# Patient Record
Sex: FEMALE | Race: WHITE | Employment: UNEMPLOYED | URBAN - METROPOLITAN AREA
[De-identification: names, ages, dates, MRNs, and addresses within clinical notes are randomized per-mention and may not be internally consistent; named-entity substitution may affect disease eponyms.]

---

## 2017-03-08 ENCOUNTER — OFFICE VISIT (OUTPATIENT)
Dept: INTERNAL MEDICINE CLINIC | Age: 32
End: 2017-03-08

## 2017-03-08 VITALS
WEIGHT: 159 LBS | HEART RATE: 72 BPM | DIASTOLIC BLOOD PRESSURE: 60 MMHG | HEIGHT: 63 IN | SYSTOLIC BLOOD PRESSURE: 100 MMHG | BODY MASS INDEX: 28.17 KG/M2

## 2017-03-08 DIAGNOSIS — F19.20 DRUG ABUSE AND DEPENDENCE (HCC): Chronic | ICD-10-CM

## 2017-03-08 DIAGNOSIS — G89.29 CHRONIC BILATERAL LOW BACK PAIN WITHOUT SCIATICA: ICD-10-CM

## 2017-03-08 DIAGNOSIS — F25.9 SCHIZOAFFECTIVE DISORDER, UNSPECIFIED TYPE (HCC): Primary | Chronic | ICD-10-CM

## 2017-03-08 DIAGNOSIS — M54.50 CHRONIC BILATERAL LOW BACK PAIN WITHOUT SCIATICA: ICD-10-CM

## 2017-03-08 DIAGNOSIS — Z72.0 TOBACCO ABUSE: ICD-10-CM

## 2017-03-08 DIAGNOSIS — M79.7 FIBROMYALGIA: Chronic | ICD-10-CM

## 2017-03-08 DIAGNOSIS — F41.1 ANXIETY STATE: ICD-10-CM

## 2017-03-08 PROCEDURE — 99213 OFFICE O/P EST LOW 20 MIN: CPT | Performed by: INTERNAL MEDICINE

## 2017-03-08 RX ORDER — MUPIROCIN CALCIUM 20 MG/G
CREAM TOPICAL
Refills: 0 | COMMUNITY
Start: 2017-03-02 | End: 2018-01-31 | Stop reason: CLARIF

## 2017-03-08 RX ORDER — CEPHALEXIN 500 MG/1
CAPSULE ORAL
Refills: 0 | COMMUNITY
Start: 2017-03-02 | End: 2017-11-03

## 2017-03-20 DIAGNOSIS — G89.29 CHRONIC BACK PAIN: ICD-10-CM

## 2017-03-20 DIAGNOSIS — M54.9 CHRONIC BACK PAIN: ICD-10-CM

## 2017-03-20 RX ORDER — GABAPENTIN 800 MG/1
TABLET ORAL
Qty: 90 TABLET | Refills: 0 | Status: SHIPPED | OUTPATIENT
Start: 2017-03-20 | End: 2017-04-28 | Stop reason: SDUPTHER

## 2017-05-22 DIAGNOSIS — G89.29 CHRONIC BILATERAL LOW BACK PAIN WITHOUT SCIATICA: ICD-10-CM

## 2017-05-22 DIAGNOSIS — M79.7 FIBROMYALGIA: Chronic | ICD-10-CM

## 2017-05-22 DIAGNOSIS — M54.50 CHRONIC BILATERAL LOW BACK PAIN WITHOUT SCIATICA: ICD-10-CM

## 2017-05-22 RX ORDER — CELECOXIB 200 MG/1
CAPSULE ORAL
Qty: 60 CAPSULE | Refills: 0 | Status: SHIPPED | OUTPATIENT
Start: 2017-05-22 | End: 2017-06-20 | Stop reason: SDUPTHER

## 2017-06-26 ENCOUNTER — OFFICE VISIT (OUTPATIENT)
Dept: INTERNAL MEDICINE CLINIC | Age: 32
End: 2017-06-26

## 2017-06-26 VITALS
HEIGHT: 63 IN | HEART RATE: 80 BPM | SYSTOLIC BLOOD PRESSURE: 102 MMHG | BODY MASS INDEX: 25.52 KG/M2 | DIASTOLIC BLOOD PRESSURE: 68 MMHG | WEIGHT: 144 LBS

## 2017-06-26 DIAGNOSIS — F19.20 DRUG ABUSE AND DEPENDENCE (HCC): Primary | Chronic | ICD-10-CM

## 2017-06-26 DIAGNOSIS — F25.9 SCHIZOAFFECTIVE DISORDER, UNSPECIFIED TYPE (HCC): Chronic | ICD-10-CM

## 2017-06-26 PROCEDURE — 99213 OFFICE O/P EST LOW 20 MIN: CPT | Performed by: INTERNAL MEDICINE

## 2017-08-30 RX ORDER — TRIAMCINOLONE ACETONIDE 1 MG/G
CREAM TOPICAL
Qty: 454 G | Refills: 0 | Status: SHIPPED | OUTPATIENT
Start: 2017-08-30 | End: 2018-01-31 | Stop reason: CLARIF

## 2017-11-03 ENCOUNTER — HOSPITAL ENCOUNTER (OUTPATIENT)
Dept: OTHER | Age: 32
Discharge: HOME | End: 2017-11-03
Attending: INTERNAL MEDICINE | Admitting: INTERNAL MEDICINE

## 2017-11-03 ENCOUNTER — OFFICE VISIT (OUTPATIENT)
Dept: INTERNAL MEDICINE CLINIC | Age: 32
End: 2017-11-03

## 2017-11-03 VITALS
HEART RATE: 100 BPM | DIASTOLIC BLOOD PRESSURE: 72 MMHG | WEIGHT: 149 LBS | SYSTOLIC BLOOD PRESSURE: 134 MMHG | BODY MASS INDEX: 26.39 KG/M2

## 2017-11-03 DIAGNOSIS — R10.2 PELVIC PAIN IN FEMALE: ICD-10-CM

## 2017-11-03 DIAGNOSIS — M79.7 FIBROMYALGIA: Primary | Chronic | ICD-10-CM

## 2017-11-03 LAB
AMPHETAMINE SCREEN, URINE: POSITIVE
BARBITURATE SCREEN URINE: ABNORMAL
BENZODIAZEPINE SCREEN, URINE: POSITIVE
CANNABINOID SCREEN URINE: POSITIVE
COCAINE METABOLITE SCREEN URINE: ABNORMAL
GONADOTROPIN, CHORIONIC (HCG) QUANT: <5 MIU/ML
Lab: ABNORMAL
METHADONE SCREEN, URINE: ABNORMAL
OPIATE SCREEN URINE: ABNORMAL
OXYCODONE URINE: ABNORMAL
PH UA: 6
PHENCYCLIDINE SCREEN URINE: ABNORMAL
PROPOXYPHENE SCREEN: ABNORMAL

## 2017-11-03 PROCEDURE — 4004F PT TOBACCO SCREEN RCVD TLK: CPT | Performed by: INTERNAL MEDICINE

## 2017-11-03 PROCEDURE — 99213 OFFICE O/P EST LOW 20 MIN: CPT | Performed by: INTERNAL MEDICINE

## 2017-11-03 PROCEDURE — 96372 THER/PROPH/DIAG INJ SC/IM: CPT | Performed by: INTERNAL MEDICINE

## 2017-11-03 PROCEDURE — G8417 CALC BMI ABV UP PARAM F/U: HCPCS | Performed by: INTERNAL MEDICINE

## 2017-11-03 PROCEDURE — G8427 DOCREV CUR MEDS BY ELIG CLIN: HCPCS | Performed by: INTERNAL MEDICINE

## 2017-11-03 PROCEDURE — G8484 FLU IMMUNIZE NO ADMIN: HCPCS | Performed by: INTERNAL MEDICINE

## 2017-11-03 PROCEDURE — 3045F PR MOST RECENT HEMOGLOBIN A1C LEVEL 7.0-9.0%: CPT | Performed by: INTERNAL MEDICINE

## 2017-11-03 RX ORDER — PREDNISONE 10 MG/1
TABLET ORAL
Qty: 30 TABLET | Refills: 0 | Status: SHIPPED | OUTPATIENT
Start: 2017-11-03 | End: 2018-05-23 | Stop reason: ALTCHOICE

## 2017-11-03 RX ORDER — KETOROLAC TROMETHAMINE 30 MG/ML
60 INJECTION, SOLUTION INTRAMUSCULAR; INTRAVENOUS ONCE
Status: COMPLETED | OUTPATIENT
Start: 2017-11-03 | End: 2017-11-03

## 2017-11-03 RX ADMIN — KETOROLAC TROMETHAMINE 60 MG: 30 INJECTION, SOLUTION INTRAMUSCULAR; INTRAVENOUS at 17:06

## 2017-11-03 NOTE — PROGRESS NOTES
Subjective:      Patient ID: Nikki Alex is a 34 y.o. female. HPI  Patient comes the office today complaining of bilateral hip pain and low back pain. Patient reports that this is been going on for the last 4 months or so. She was last seen here about 4 months ago. Because of the patient's history of IV drug abuse and her recent overdose (which she denied happening), she has never been given any narcotic pain medication. Patient has been unable to participate in her activities of daily living including helping her children get to school and do things that they need. She reports that her  has had to do all of the activities. She reports to me that she is not taking any illegal drugs and reports that she is clean and has been for the last several months. Review of Systems    Objective:   Physical Exam   Constitutional: She is oriented to person, place, and time. She appears well-developed and well-nourished. No distress. HENT:   Head: Normocephalic and atraumatic. Pulmonary/Chest: Effort normal. No respiratory distress. Musculoskeletal: She exhibits tenderness (Paraspinal muscles, sacroiliac joints bilaterally and proximal muscles of the lower extremities. ). She exhibits no deformity. She has decreased range of motion in the hips/pelvis reason. She is able to abduct, adduct, flex and extend. She does have a treatment center amount of expressed pain however. Her no physical findings when she does a stings. There is no crepitus. Neurological: She is alert and oriented to person, place, and time. No cranial nerve deficit. Skin: Skin is warm and dry. She is not diaphoretic. Psychiatric: She has a normal mood and affect. Her behavior is normal. Judgment and thought content normal.   Vitals reviewed. Assessment/Plan:  Tk Enriquez was seen today for hip pain.     Diagnoses and all orders for this visit:    Fibromyalgia  Comments:  Patient is taking duloxetine and

## 2017-11-13 DIAGNOSIS — G89.29 CHRONIC BACK PAIN: ICD-10-CM

## 2017-11-13 DIAGNOSIS — M54.9 CHRONIC BACK PAIN: ICD-10-CM

## 2017-11-13 RX ORDER — GABAPENTIN 800 MG/1
800 TABLET ORAL 3 TIMES DAILY
Qty: 90 TABLET | Refills: 3 | Status: SHIPPED | OUTPATIENT
Start: 2017-11-13 | End: 2018-02-23 | Stop reason: SDUPTHER

## 2017-11-13 NOTE — TELEPHONE ENCOUNTER
From: Marilu Ochoa  To: Phoebe Bruno MD  Sent: 11/11/2017 5:20 AM EST  Subject: Medication Renewal Request    Danibozena Paul.  Esthelalola Coco would like a refill of the following medications:  gabapentin (NEURONTIN) 800 MG tablet Phoebe Bruno MD]    Preferred pharmacy: [facility] [address]    Comment:

## 2018-01-31 ENCOUNTER — OFFICE VISIT (OUTPATIENT)
Dept: ENDOCRINOLOGY | Age: 33
End: 2018-01-31

## 2018-01-31 ENCOUNTER — HOSPITAL ENCOUNTER (OUTPATIENT)
Dept: OTHER | Age: 33
Discharge: HOME | End: 2018-01-31
Attending: INTERNAL MEDICINE | Admitting: INTERNAL MEDICINE

## 2018-01-31 VITALS
HEIGHT: 63 IN | HEART RATE: 80 BPM | BODY MASS INDEX: 25.16 KG/M2 | WEIGHT: 142 LBS | SYSTOLIC BLOOD PRESSURE: 118 MMHG | DIASTOLIC BLOOD PRESSURE: 74 MMHG

## 2018-01-31 DIAGNOSIS — E10.8 TYPE 1 DIABETES MELLITUS WITH COMPLICATION (HCC): ICD-10-CM

## 2018-01-31 DIAGNOSIS — E10.8 TYPE 1 DIABETES MELLITUS WITH COMPLICATION (HCC): Primary | ICD-10-CM

## 2018-01-31 LAB
ANION GAP SERPL CALCULATED.3IONS-SCNC: 15 MMOL/L (ref 3–16)
BUN BLDV-MCNC: 9 MG/DL (ref 7–20)
CALCIUM SERPL-MCNC: 9.7 MG/DL (ref 8.3–10.6)
CHLORIDE BLD-SCNC: 99 MMOL/L (ref 99–110)
CO2: 25 MMOL/L (ref 21–32)
CREAT SERPL-MCNC: 0.6 MG/DL (ref 0.6–1.1)
CREATININE URINE: 69.3 MG/DL (ref 28–259)
GFR AFRICAN AMERICAN: >60
GFR NON-AFRICAN AMERICAN: >60
GLUCOSE BLD-MCNC: 243 MG/DL (ref 70–99)
MICROALBUMIN UR-MCNC: <1.2 MG/DL
MICROALBUMIN/CREAT UR-RTO: NORMAL MG/G (ref 0–30)
POTASSIUM SERPL-SCNC: 3.8 MMOL/L (ref 3.5–5.1)
SODIUM BLD-SCNC: 139 MMOL/L (ref 136–145)

## 2018-01-31 PROCEDURE — 96160 PT-FOCUSED HLTH RISK ASSMT: CPT | Performed by: INTERNAL MEDICINE

## 2018-01-31 PROCEDURE — 99215 OFFICE O/P EST HI 40 MIN: CPT | Performed by: INTERNAL MEDICINE

## 2018-01-31 RX ORDER — LANCING DEVICE
EACH MISCELLANEOUS
COMMUNITY
Start: 2017-08-30 | End: 2020-06-30

## 2018-01-31 RX ORDER — LISINOPRIL 2.5 MG/1
2.5 TABLET ORAL DAILY
Qty: 30 TABLET | Refills: 3 | Status: SHIPPED | OUTPATIENT
Start: 2018-01-31 | End: 2018-09-07 | Stop reason: SDUPTHER

## 2018-01-31 RX ORDER — SYRINGE-NEEDLE,INSULIN,0.5 ML 28GX1/2"
SYRINGE, EMPTY DISPOSABLE MISCELLANEOUS
Qty: 10 EACH | Refills: 0 | Status: SHIPPED | OUTPATIENT
Start: 2018-01-31 | End: 2018-09-20 | Stop reason: SDUPTHER

## 2018-01-31 RX ORDER — HYDROXYZINE PAMOATE 50 MG/1
50 CAPSULE ORAL 3 TIMES DAILY PRN
COMMUNITY
Start: 2017-08-23

## 2018-01-31 RX ORDER — LISINOPRIL 2.5 MG/1
TABLET ORAL
COMMUNITY
Start: 2017-12-04 | End: 2018-01-31 | Stop reason: SDUPTHER

## 2018-01-31 RX ORDER — LANCETS 26 GAUGE
EACH MISCELLANEOUS
COMMUNITY
Start: 2017-08-30 | End: 2021-08-11

## 2018-01-31 RX ORDER — RISPERIDONE 1 MG/1
1 TABLET, FILM COATED ORAL
COMMUNITY
Start: 2015-05-02 | End: 2019-05-21

## 2018-01-31 RX ORDER — RISPERIDONE 2 MG/1
2 TABLET, FILM COATED ORAL
COMMUNITY
Start: 2017-08-23 | End: 2019-05-21

## 2018-01-31 RX ORDER — ALPRAZOLAM 1 MG/1
0.5 TABLET ORAL 2 TIMES DAILY
COMMUNITY
End: 2019-11-05

## 2018-01-31 RX ORDER — METHYLPHENIDATE HYDROCHLORIDE 10 MG/1
TABLET ORAL
Refills: 0 | COMMUNITY
Start: 2018-01-09 | End: 2018-09-20

## 2018-01-31 RX ORDER — METHYLPHENIDATE HYDROCHLORIDE 20 MG/1
20 TABLET ORAL
COMMUNITY
End: 2018-09-20

## 2018-01-31 RX ORDER — BREXPIPRAZOLE 2 MG/1
TABLET ORAL
Refills: 1 | COMMUNITY
Start: 2018-01-29 | End: 2019-05-21

## 2018-01-31 ASSESSMENT — PATIENT HEALTH QUESTIONNAIRE - PHQ9
1. LITTLE INTEREST OR PLEASURE IN DOING THINGS: 0
7. TROUBLE CONCENTRATING ON THINGS, SUCH AS READING THE NEWSPAPER OR WATCHING TELEVISION: 3
3. TROUBLE FALLING OR STAYING ASLEEP: 2
9. THOUGHTS THAT YOU WOULD BE BETTER OFF DEAD, OR OF HURTING YOURSELF: 0
SUM OF ALL RESPONSES TO PHQ QUESTIONS 1-9: 17
6. FEELING BAD ABOUT YOURSELF - OR THAT YOU ARE A FAILURE OR HAVE LET YOURSELF OR YOUR FAMILY DOWN: 1
5. POOR APPETITE OR OVEREATING: 2
SUM OF ALL RESPONSES TO PHQ9 QUESTIONS 1 & 2: 3
2. FEELING DOWN, DEPRESSED OR HOPELESS: 3
4. FEELING TIRED OR HAVING LITTLE ENERGY: 3
8. MOVING OR SPEAKING SO SLOWLY THAT OTHER PEOPLE COULD HAVE NOTICED. OR THE OPPOSITE, BEING SO FIGETY OR RESTLESS THAT YOU HAVE BEEN MOVING AROUND A LOT MORE THAN USUAL: 3
10. IF YOU CHECKED OFF ANY PROBLEMS, HOW DIFFICULT HAVE THESE PROBLEMS MADE IT FOR YOU TO DO YOUR WORK, TAKE CARE OF THINGS AT HOME, OR GET ALONG WITH OTHER PEOPLE: 1

## 2018-01-31 ASSESSMENT — ENCOUNTER SYMPTOMS: VISUAL CHANGE: 0

## 2018-01-31 NOTE — PROGRESS NOTES
wheezing, no dyspnea on exertion, no cough  Cardiovascular: no chest pain, no lower extremity edema, no orthopnea, no intermittent leg claudication, no palpitations  Gastrointestinal: no abdominal pain, no nausea, no vomiting, no diarrhea, no constipation, no dysphagia, no heartburn, no bloating  Genitourinary: no dysuria, no urinary incontinence, no urinary hesitancy, no urinary frequency, no feelings of urinary urgency, no nocturia  Musculoskeletal: no joint swelling, no joint stiffness, no joint pain, no muscle cramps, no muscle pain, no bone pain, no fractures  Integument/Breast: hair loss, but no skin rashes, no skin lesions, no itching, no dry skin, no breast pain, no breast mass, no skin hives, no skin discoloration, no nipple discharge  Neurological: no numbness, no tingling, no weakness, no confusion, no headaches, no dizziness, no fainting, no tremors, no decrease in memory, no balance problems  Psychiatric: no anxiety, no depression, no insomnia, no change in personality, no emotional problems, no stress  Hematologic/Lymphatic: no tendency for easy bleeding, no swollen lymph nodes, no tendency for easy bruising  Immunology: no seasonal allergies, no frequent infections, no frequent illnesses  Endocrine: no temperature intolerance no hirsutism, no hot flashes    OBJECTIVE:  /74 (Site: Left Arm, Position: Sitting)   Pulse 80   Ht 5' 3\" (1.6 m)   Wt 142 lb (64.4 kg)   Breastfeeding?  No   BMI 25.15 kg/m²    Wt Readings from Last 3 Encounters:   01/31/18 142 lb (64.4 kg)   11/03/17 149 lb (67.6 kg)   06/26/17 144 lb (65.3 kg)       Constitutional: no acute distress, well appearing and well nourished  Psychiatric: oriented to person, place and time, judgement and insight and normal, recent and remote memory and intact and mood and affect are normal  Skin: skin and subcutaneous tissue is normal without mass, normal turgor  Head and Face: examination of head and face revealed no abnormalities  Eyes: no

## 2018-02-01 LAB
ESTIMATED AVERAGE GLUCOSE: 203 MG/DL
HBA1C MFR BLD: 8.7 %
TSH, 3RD GENERATION: 0.67 MU/L (ref 0.3–4)

## 2018-02-02 LAB — C-PEPTIDE: <0.1 NG/ML (ref 1.1–4.4)

## 2018-02-03 LAB
VITAMIN D2 AND D3, TOTAL: 16.5 NG/ML (ref 30–80)
VITAMIN D2, 25 HYDROXY: <1 NG/ML
VITAMIN D3,25 HYDROXY: 16.5 NG/ML

## 2018-02-05 ENCOUNTER — TELEPHONE (OUTPATIENT)
Dept: ENDOCRINOLOGY | Age: 33
End: 2018-02-05

## 2018-02-06 NOTE — TELEPHONE ENCOUNTER
I call pt and advice her lab result  Pt states that she called [address] to get upgrade and they have insurance problem.

## 2018-02-23 DIAGNOSIS — G89.29 CHRONIC BACK PAIN: ICD-10-CM

## 2018-02-23 DIAGNOSIS — M54.9 CHRONIC BACK PAIN: ICD-10-CM

## 2018-02-23 RX ORDER — GABAPENTIN 800 MG/1
800 TABLET ORAL 3 TIMES DAILY
Qty: 90 TABLET | Refills: 3 | Status: SHIPPED | OUTPATIENT
Start: 2018-02-23 | End: 2018-06-21 | Stop reason: SDUPTHER

## 2018-04-27 ENCOUNTER — TELEPHONE (OUTPATIENT)
Dept: ENDOCRINOLOGY | Age: 33
End: 2018-04-27

## 2018-05-07 RX ORDER — BLOOD-GLUCOSE METER
KIT MISCELLANEOUS
Qty: 150 STRIP | Refills: 3 | Status: SHIPPED | OUTPATIENT
Start: 2018-05-07 | End: 2018-10-08 | Stop reason: SDUPTHER

## 2018-05-07 RX ORDER — TRIAMCINOLONE ACETONIDE 1 MG/G
CREAM TOPICAL
Qty: 454 G | Refills: 0 | Status: SHIPPED | OUTPATIENT
Start: 2018-05-07 | End: 2019-11-05

## 2018-05-23 ENCOUNTER — OFFICE VISIT (OUTPATIENT)
Dept: INTERNAL MEDICINE CLINIC | Age: 33
End: 2018-05-23

## 2018-05-23 VITALS
SYSTOLIC BLOOD PRESSURE: 120 MMHG | HEART RATE: 84 BPM | DIASTOLIC BLOOD PRESSURE: 70 MMHG | BODY MASS INDEX: 24.63 KG/M2 | HEIGHT: 63 IN | WEIGHT: 139 LBS

## 2018-05-23 DIAGNOSIS — F19.20 DRUG ABUSE AND DEPENDENCE (HCC): Primary | Chronic | ICD-10-CM

## 2018-05-23 DIAGNOSIS — F25.9 SCHIZOAFFECTIVE DISORDER, UNSPECIFIED TYPE (HCC): Chronic | ICD-10-CM

## 2018-05-23 PROCEDURE — 4004F PT TOBACCO SCREEN RCVD TLK: CPT | Performed by: INTERNAL MEDICINE

## 2018-05-23 PROCEDURE — 3045F PR MOST RECENT HEMOGLOBIN A1C LEVEL 7.0-9.0%: CPT | Performed by: INTERNAL MEDICINE

## 2018-05-23 PROCEDURE — 2022F DILAT RTA XM EVC RTNOPTHY: CPT | Performed by: INTERNAL MEDICINE

## 2018-05-23 PROCEDURE — G8420 CALC BMI NORM PARAMETERS: HCPCS | Performed by: INTERNAL MEDICINE

## 2018-05-23 PROCEDURE — G8427 DOCREV CUR MEDS BY ELIG CLIN: HCPCS | Performed by: INTERNAL MEDICINE

## 2018-05-23 PROCEDURE — 99213 OFFICE O/P EST LOW 20 MIN: CPT | Performed by: INTERNAL MEDICINE

## 2018-05-28 DIAGNOSIS — M79.7 FIBROMYALGIA: Chronic | ICD-10-CM

## 2018-05-28 DIAGNOSIS — M54.50 CHRONIC BILATERAL LOW BACK PAIN WITHOUT SCIATICA: ICD-10-CM

## 2018-05-28 DIAGNOSIS — G89.29 CHRONIC BILATERAL LOW BACK PAIN WITHOUT SCIATICA: ICD-10-CM

## 2018-05-28 RX ORDER — CELECOXIB 200 MG/1
200 CAPSULE ORAL 2 TIMES DAILY
Qty: 60 CAPSULE | Refills: 5 | Status: SHIPPED | OUTPATIENT
Start: 2018-05-28 | End: 2019-05-21

## 2018-06-22 DIAGNOSIS — G89.29 CHRONIC BACK PAIN: ICD-10-CM

## 2018-06-22 DIAGNOSIS — M54.9 CHRONIC BACK PAIN: ICD-10-CM

## 2018-06-22 RX ORDER — GABAPENTIN 800 MG/1
800 TABLET ORAL 3 TIMES DAILY
Qty: 90 TABLET | Refills: 3 | Status: SHIPPED | OUTPATIENT
Start: 2018-06-22 | End: 2018-07-19 | Stop reason: SDUPTHER

## 2018-07-19 DIAGNOSIS — G89.29 CHRONIC BACK PAIN: ICD-10-CM

## 2018-07-19 DIAGNOSIS — M54.9 CHRONIC BACK PAIN: ICD-10-CM

## 2018-07-19 RX ORDER — GABAPENTIN 800 MG/1
800 TABLET ORAL 3 TIMES DAILY
Qty: 90 TABLET | Refills: 3 | Status: SHIPPED | OUTPATIENT
Start: 2018-07-19 | End: 2018-09-20

## 2018-08-30 ENCOUNTER — TELEPHONE (OUTPATIENT)
Dept: ENDOCRINOLOGY | Age: 33
End: 2018-08-30

## 2018-08-30 DIAGNOSIS — E10.3292 TYPE 1 DIABETES MELLITUS WITH MILD NONPROLIFERATIVE RETINOPATHY OF LEFT EYE WITHOUT MACULAR EDEMA (HCC): Primary | ICD-10-CM

## 2018-08-30 NOTE — TELEPHONE ENCOUNTER
Fax from [facility] re: Drug therapy recommendation. Patient is taking Lisinopril and Celecoxib, please review/evaluate and monitor BP and kidney function.  Send new or Changed rx

## 2018-09-10 ENCOUNTER — TELEPHONE (OUTPATIENT)
Dept: INTERNAL MEDICINE CLINIC | Age: 33
End: 2018-09-10

## 2018-09-20 ENCOUNTER — OFFICE VISIT (OUTPATIENT)
Dept: ENDOCRINOLOGY | Age: 33
End: 2018-09-20

## 2018-09-20 VITALS
WEIGHT: 138 LBS | DIASTOLIC BLOOD PRESSURE: 66 MMHG | SYSTOLIC BLOOD PRESSURE: 114 MMHG | HEIGHT: 63 IN | OXYGEN SATURATION: 100 % | HEART RATE: 85 BPM | BODY MASS INDEX: 24.45 KG/M2

## 2018-09-20 DIAGNOSIS — F41.1 ANXIETY STATE: ICD-10-CM

## 2018-09-20 DIAGNOSIS — K21.9 GASTROESOPHAGEAL REFLUX DISEASE WITHOUT ESOPHAGITIS: Chronic | ICD-10-CM

## 2018-09-20 DIAGNOSIS — F25.0 SCHIZOAFFECTIVE DISORDER, BIPOLAR TYPE (HCC): Chronic | ICD-10-CM

## 2018-09-20 DIAGNOSIS — M54.40 CHRONIC LOW BACK PAIN WITH SCIATICA, SCIATICA LATERALITY UNSPECIFIED, UNSPECIFIED BACK PAIN LATERALITY: ICD-10-CM

## 2018-09-20 DIAGNOSIS — F19.20 DRUG ABUSE AND DEPENDENCE (HCC): Chronic | ICD-10-CM

## 2018-09-20 DIAGNOSIS — E78.2 MIXED HYPERLIPIDEMIA: ICD-10-CM

## 2018-09-20 DIAGNOSIS — G89.29 CHRONIC LOW BACK PAIN WITH SCIATICA, SCIATICA LATERALITY UNSPECIFIED, UNSPECIFIED BACK PAIN LATERALITY: ICD-10-CM

## 2018-09-20 PROCEDURE — 2022F DILAT RTA XM EVC RTNOPTHY: CPT | Performed by: INTERNAL MEDICINE

## 2018-09-20 PROCEDURE — 83036 HEMOGLOBIN GLYCOSYLATED A1C: CPT | Performed by: INTERNAL MEDICINE

## 2018-09-20 PROCEDURE — 99215 OFFICE O/P EST HI 40 MIN: CPT | Performed by: INTERNAL MEDICINE

## 2018-09-20 PROCEDURE — G8427 DOCREV CUR MEDS BY ELIG CLIN: HCPCS | Performed by: INTERNAL MEDICINE

## 2018-09-20 PROCEDURE — G8420 CALC BMI NORM PARAMETERS: HCPCS | Performed by: INTERNAL MEDICINE

## 2018-09-20 PROCEDURE — 4004F PT TOBACCO SCREEN RCVD TLK: CPT | Performed by: INTERNAL MEDICINE

## 2018-09-20 PROCEDURE — 3046F HEMOGLOBIN A1C LEVEL >9.0%: CPT | Performed by: INTERNAL MEDICINE

## 2018-09-20 RX ORDER — SYRINGE-NEEDLE,INSULIN,0.5 ML 28GX1/2"
SYRINGE, EMPTY DISPOSABLE MISCELLANEOUS
Qty: 10 EACH | Refills: 0 | Status: SHIPPED | OUTPATIENT
Start: 2018-09-20

## 2018-09-20 ASSESSMENT — ENCOUNTER SYMPTOMS: VISUAL CHANGE: 0

## 2018-09-20 NOTE — PROGRESS NOTES
years ago. Her disease course has been stable. Hypoglycemia symptoms include confusion, dizziness, nervousness/anxiousness and sweats. Associated symptoms include polyuria. Pertinent negatives for diabetes include no visual change, no weakness and no weight loss. There are no hypoglycemic complications. Pertinent negatives for hypoglycemia complications include no hospitalization, no nocturnal hypoglycemia, no required assistance and no required glucagon injection. Symptoms are stable. Diabetic complications include peripheral neuropathy. Risk factors for coronary artery disease include dyslipidemia and diabetes mellitus. Current diabetic treatment includes insulin pump. She is compliant with treatment most of the time. Her weight is stable. She is following a diabetic diet. Meal planning includes avoidance of concentrated sweets. She has not had a previous visit with a dietitian. She rarely participates in exercise. There is no change in her home blood glucose trend. Her breakfast blood glucose is taken between 7-8 am. Her breakfast blood glucose range is generally 110-130 mg/dl. Her lunch blood glucose is taken between 12-1 pm. Her lunch blood glucose range is generally 110-130 mg/dl. Her dinner blood glucose is taken between 7-8 pm. Her dinner blood glucose range is generally 140-180 mg/dl. An ACE inhibitor/angiotensin II receptor blocker is being taken. She does not see a podiatrist.Eye exam is current.       Her medications for anxiety were adjusted by the new psychiatrist and she feels it is making her symptoms worse. Her pain medications and also been reduced. She has been smoking weed and getting medical marijuana from out of state    Weight trend: stable  Prior visit with dietician: yes  Current diet: on average, 3 meals per day  Current exercise: rare    Has there been any hospitalization, surgery or major illness since the last visit?  No   Has there been any new school, family or social problems since celecoxib (CELEBREX) 200 MG capsule Take 1 capsule by mouth 2 times daily 60 capsule 5    FREESTYLE LITE strip USE TO CHECK BLOOD SUGAR FOUR TIMES DAILY 150 strip 3    triamcinolone (KENALOG) 0.1 % cream APPLY TWICE DAILY 454 g 0    omeprazole (PRILOSEC) 20 MG delayed release capsule TAKE 1 CAPSULE BY MOUTH DAILY 30 capsule 5    REXULTI 2 MG TABS tablet TK 1 T PO QD  1    Lancet Devices (LANCING DEVICE) MISC Use to test BG 6 times daily, Freestyle Lite      LANCETS ULTRA THIN MISC Use to check sugar 4 times daily      hydrOXYzine (VISTARIL) 50 MG capsule Take 50 mg by mouth      ALPRAZolam (XANAX) 1 MG tablet Take 0.5 mg by mouth 2 times daily. [facility] [place] risperiDONE (RISPERDAL) 1 MG tablet Take 1 mg by mouth      risperiDONE (RISPERDAL) 2 MG tablet Take 2 mg by mouth      glucagon 1 MG injection Inject 1 mg into the skin See Admin Instructions Follow package directions for low blood sugar. 1 kit 3    DULoxetine (CYMBALTA) 60 MG capsule Take 90 mg by mouth daily.  traZODone (DESYREL) 100 MG tablet Take 250-400 mg by mouth nightly        No current facility-administered medications for this visit.       Allergies   Allergen Reactions    Ultram [Tramadol]      Family Status   Relation Status    Mother     Father Alive       Review of Systems  Constitutional: no fatigue, no fever, no recent weight gain, no recent weight loss, no changes in appetite  Eyes: no eye pain, no change in vision, no eye redness, no eye irritation, no double vision  Ears, nose, throat: no nasal congestion, no sore throat, no earache, no decrease in hearing, no hoarseness, no dry mouth, no sinus problems, no difficulty swallowing, no neck lumps, no dental problems, no mouth sores, no ringing in ears  Pulmonary: no shortness of breath, no wheezing, no dyspnea on exertion, no cough  Cardiovascular: no chest pain, no lower extremity edema, no orthopnea, no intermittent leg claudication, no palpitations  Gastrointestinal: no tried Lyrica , cymbalta and currently is on 800 mg of Neurontin TID per PCP   She was on metformin in the past but it was stopped --- Last DKA in dec 2015 with pump tubing issues . --- Thyroid normal on palpation No discreet thyroid nodules identified on exam     Hyperlipidemia   ldl borderline 100 >84  She was advised to watch her diet and institute low fat diet   tfts normal     VIT D level was 26   Advised to increase supplement     Migraine headaches   Getting worse as per pt   She was advised to discuss with her primary care physician    Chronic back pain    she was cautioned about anti-inflammatory medication and chronic kidney   She is taking antiinflammatory and it has improved     DEPRESSION/ANXIETY   Follows with psyciatry   She is not happy with all these medication change   Her xanax was reduced and she was taken off ADD medication            Reviewed and/or ordered clinical lab results Yes  Reviewed and/or ordered radiology tests Yes  Reviewed and/or ordered other diagnostic tests Yes  Made a decision to obtain old records Yes  Reviewed and summarized old records Yes      Sherri Quintanilla was counseled regarding symptoms of current diagnosis, course and complications of disease if inadequately treated, side effects of medications, diagnosis, treatment options, and prognosis, risks, benefits, complications, and alternatives of treatment, labs, imaging and other studies and treatment targets and goals. She understands instructions and counseling. These diagnosis were discussed and reviewed with the patient including the advantages of drug therapy. She was counseled at this visit on the following: diabetes complication prevention and foot care. I spent  40 minutes with patient and more than 50 % of this time was spent discussing and providing counseling and coordinating care of patient's multiple health issues      Return in about 3 months (around 12/20/2018).

## 2018-09-21 LAB — HBA1C MFR BLD: 10.6 %

## 2018-09-25 ENCOUNTER — HOSPITAL ENCOUNTER (OUTPATIENT)
Age: 33
Discharge: HOME | End: 2018-09-25
Payer: COMMERCIAL

## 2018-09-25 DIAGNOSIS — E78.2 MIXED HYPERLIPIDEMIA: ICD-10-CM

## 2018-09-25 DIAGNOSIS — E10.3292 TYPE 1 DIABETES MELLITUS WITH MILD NONPROLIFERATIVE RETINOPATHY OF LEFT EYE WITHOUT MACULAR EDEMA (HCC): ICD-10-CM

## 2018-09-25 LAB
A/G RATIO: 1.2 (ref 1.1–2.2)
ALBUMIN SERPL-MCNC: 4.1 G/DL (ref 3.4–5)
ALP BLD-CCNC: 115 U/L (ref 40–129)
ALT SERPL-CCNC: 25 U/L (ref 10–40)
ANION GAP SERPL CALCULATED.3IONS-SCNC: 12 MMOL/L (ref 3–16)
AST SERPL-CCNC: 26 U/L (ref 15–37)
BILIRUB SERPL-MCNC: 0.3 MG/DL (ref 0–1)
BUN BLDV-MCNC: 10 MG/DL (ref 7–20)
CALCIUM SERPL-MCNC: 9.3 MG/DL (ref 8.3–10.6)
CHLORIDE BLD-SCNC: 103 MMOL/L (ref 99–110)
CHOLESTEROL, TOTAL: 155 MG/DL (ref 0–199)
CO2: 24 MMOL/L (ref 21–32)
CREAT SERPL-MCNC: 0.7 MG/DL (ref 0.6–1.1)
CREATININE URINE: 113.5 MG/DL (ref 28–259)
ESTIMATED AVERAGE GLUCOSE: 248.9 MG/DL
GFR AFRICAN AMERICAN: >60
GFR NON-AFRICAN AMERICAN: >60
GLOBULIN: 3.5 G/DL
GLUCOSE BLD-MCNC: 238 MG/DL (ref 70–99)
HBA1C MFR BLD: 10.3 %
HDLC SERPL-MCNC: 48 MG/DL (ref 40–60)
LDL CHOLESTEROL CALCULATED: 93 MG/DL
MICROALBUMIN UR-MCNC: <1.2 MG/DL
MICROALBUMIN/CREAT UR-RTO: NORMAL MG/G (ref 0–30)
POTASSIUM SERPL-SCNC: 4.6 MMOL/L (ref 3.5–5.1)
SODIUM BLD-SCNC: 139 MMOL/L (ref 136–145)
TOTAL PROTEIN: 7.6 G/DL (ref 6.4–8.2)
TRIGL SERPL-MCNC: 71 MG/DL (ref 0–150)
TSH SERPL DL<=0.05 MIU/L-ACNC: 0.62 UIU/ML (ref 0.27–4.2)
VLDLC SERPL CALC-MCNC: 14 MG/DL

## 2018-09-25 PROCEDURE — 36415 COLL VENOUS BLD VENIPUNCTURE: CPT

## 2018-09-25 PROCEDURE — 80061 LIPID PANEL: CPT

## 2018-09-25 PROCEDURE — 84443 ASSAY THYROID STIM HORMONE: CPT

## 2018-09-25 PROCEDURE — 80053 COMPREHEN METABOLIC PANEL: CPT

## 2018-09-25 PROCEDURE — 83036 HEMOGLOBIN GLYCOSYLATED A1C: CPT

## 2018-09-25 PROCEDURE — 82043 UR ALBUMIN QUANTITATIVE: CPT

## 2018-09-25 PROCEDURE — 82570 ASSAY OF URINE CREATININE: CPT

## 2018-09-25 PROCEDURE — 82306 VITAMIN D 25 HYDROXY: CPT

## 2018-09-28 LAB
VITAMIN D2 AND D3, TOTAL: 31.9 NG/ML (ref 30–80)
VITAMIN D2, 25 HYDROXY: <1 NG/ML
VITAMIN D3,25 HYDROXY: 31.9 NG/ML

## 2018-10-01 ENCOUNTER — TELEPHONE (OUTPATIENT)
Dept: ENDOCRINOLOGY | Age: 33
End: 2018-10-01

## 2018-10-05 DIAGNOSIS — K21.9 GASTROESOPHAGEAL REFLUX DISEASE WITHOUT ESOPHAGITIS: Chronic | ICD-10-CM

## 2018-10-05 RX ORDER — OMEPRAZOLE 20 MG/1
CAPSULE, DELAYED RELEASE ORAL
Qty: 30 CAPSULE | Refills: 3 | Status: SHIPPED | OUTPATIENT
Start: 2018-10-05 | End: 2019-01-27 | Stop reason: SDUPTHER

## 2018-10-08 RX ORDER — BLOOD-GLUCOSE METER
KIT MISCELLANEOUS
Qty: 150 STRIP | Refills: 0 | Status: SHIPPED | OUTPATIENT
Start: 2018-10-08 | End: 2018-11-22 | Stop reason: SDUPTHER

## 2018-10-25 ENCOUNTER — TELEPHONE (OUTPATIENT)
Dept: ENDOCRINOLOGY | Age: 33
End: 2018-10-25

## 2018-10-25 NOTE — TELEPHONE ENCOUNTER
Pt faxed letter from [facility] stating approval for sensors for a continuous glucose monitor

## 2018-11-07 ENCOUNTER — OFFICE VISIT (OUTPATIENT)
Dept: INTERNAL MEDICINE CLINIC | Age: 33
End: 2018-11-07
Payer: COMMERCIAL

## 2018-11-07 VITALS
BODY MASS INDEX: 25.69 KG/M2 | RESPIRATION RATE: 18 BRPM | DIASTOLIC BLOOD PRESSURE: 72 MMHG | HEART RATE: 80 BPM | WEIGHT: 145 LBS | OXYGEN SATURATION: 99 % | SYSTOLIC BLOOD PRESSURE: 124 MMHG

## 2018-11-07 DIAGNOSIS — E10.649 UNCONTROLLED TYPE 1 DIABETES MELLITUS WITH HYPOGLYCEMIA, UNSPECIFIED HYPOGLYCEMIA COMA STATUS (HCC): ICD-10-CM

## 2018-11-07 DIAGNOSIS — Z23 NEED FOR INFLUENZA VACCINATION: Primary | ICD-10-CM

## 2018-11-07 DIAGNOSIS — F25.0 SCHIZOAFFECTIVE DISORDER, BIPOLAR TYPE (HCC): Chronic | ICD-10-CM

## 2018-11-07 DIAGNOSIS — Z23 NEED FOR DIPHTHERIA-TETANUS-PERTUSSIS (TDAP) VACCINE: ICD-10-CM

## 2018-11-07 DIAGNOSIS — Z11.4 ENCOUNTER FOR SCREENING FOR HIV: ICD-10-CM

## 2018-11-07 DIAGNOSIS — F19.20 DRUG ABUSE AND DEPENDENCE (HCC): Chronic | ICD-10-CM

## 2018-11-07 LAB — HBA1C MFR BLD: 10 %

## 2018-11-07 PROCEDURE — 90715 TDAP VACCINE 7 YRS/> IM: CPT | Performed by: INTERNAL MEDICINE

## 2018-11-07 PROCEDURE — G8482 FLU IMMUNIZE ORDER/ADMIN: HCPCS | Performed by: INTERNAL MEDICINE

## 2018-11-07 PROCEDURE — 3046F HEMOGLOBIN A1C LEVEL >9.0%: CPT | Performed by: INTERNAL MEDICINE

## 2018-11-07 PROCEDURE — 99213 OFFICE O/P EST LOW 20 MIN: CPT | Performed by: INTERNAL MEDICINE

## 2018-11-07 PROCEDURE — 4004F PT TOBACCO SCREEN RCVD TLK: CPT | Performed by: INTERNAL MEDICINE

## 2018-11-07 PROCEDURE — 90471 IMMUNIZATION ADMIN: CPT | Performed by: INTERNAL MEDICINE

## 2018-11-07 PROCEDURE — G8419 CALC BMI OUT NRM PARAM NOF/U: HCPCS | Performed by: INTERNAL MEDICINE

## 2018-11-07 PROCEDURE — 90686 IIV4 VACC NO PRSV 0.5 ML IM: CPT | Performed by: INTERNAL MEDICINE

## 2018-11-07 PROCEDURE — 83036 HEMOGLOBIN GLYCOSYLATED A1C: CPT | Performed by: INTERNAL MEDICINE

## 2018-11-07 PROCEDURE — 90472 IMMUNIZATION ADMIN EACH ADD: CPT | Performed by: INTERNAL MEDICINE

## 2018-11-07 PROCEDURE — 2022F DILAT RTA XM EVC RTNOPTHY: CPT | Performed by: INTERNAL MEDICINE

## 2018-11-07 PROCEDURE — G8427 DOCREV CUR MEDS BY ELIG CLIN: HCPCS | Performed by: INTERNAL MEDICINE

## 2018-11-07 ASSESSMENT — PATIENT HEALTH QUESTIONNAIRE - PHQ9
2. FEELING DOWN, DEPRESSED OR HOPELESS: 1
1. LITTLE INTEREST OR PLEASURE IN DOING THINGS: 1
SUM OF ALL RESPONSES TO PHQ9 QUESTIONS 1 & 2: 2
SUM OF ALL RESPONSES TO PHQ QUESTIONS 1-9: 2
SUM OF ALL RESPONSES TO PHQ QUESTIONS 1-9: 2

## 2018-11-21 ENCOUNTER — TELEPHONE (OUTPATIENT)
Dept: ENDOCRINOLOGY | Age: 33
End: 2018-11-21

## 2018-11-21 NOTE — TELEPHONE ENCOUNTER
Called pt to discuss pump settings yesterday as well as today again ---made appropriate changes   She

## 2018-11-24 RX ORDER — BLOOD-GLUCOSE METER
KIT MISCELLANEOUS
Qty: 150 STRIP | Refills: 0 | Status: SHIPPED | OUTPATIENT
Start: 2018-11-24 | End: 2018-12-26 | Stop reason: SDUPTHER

## 2018-12-19 LAB — HIV 1+2 AB+HIV1P24 AG, EIA: NORMAL

## 2018-12-20 ENCOUNTER — TELEPHONE (OUTPATIENT)
Dept: ENDOCRINOLOGY | Age: 33
End: 2018-12-20

## 2018-12-21 ENCOUNTER — TELEPHONE (OUTPATIENT)
Dept: ENDOCRINOLOGY | Age: 33
End: 2018-12-21

## 2018-12-27 RX ORDER — BLOOD-GLUCOSE METER
KIT MISCELLANEOUS
Qty: 150 STRIP | Refills: 5 | Status: SHIPPED | OUTPATIENT
Start: 2018-12-27 | End: 2019-05-21 | Stop reason: SDUPTHER

## 2019-01-21 LAB — DIABETIC RETINOPATHY: NORMAL

## 2019-01-24 ENCOUNTER — TELEPHONE (OUTPATIENT)
Dept: ENDOCRINOLOGY | Age: 34
End: 2019-01-24

## 2019-01-27 DIAGNOSIS — K21.9 GASTROESOPHAGEAL REFLUX DISEASE WITHOUT ESOPHAGITIS: Chronic | ICD-10-CM

## 2019-01-28 RX ORDER — OMEPRAZOLE 20 MG/1
CAPSULE, DELAYED RELEASE ORAL
Qty: 30 CAPSULE | Refills: 2 | Status: SHIPPED | OUTPATIENT
Start: 2019-01-28 | End: 2019-04-26 | Stop reason: SDUPTHER

## 2019-03-08 DIAGNOSIS — M54.9 CHRONIC BACK PAIN: ICD-10-CM

## 2019-03-08 DIAGNOSIS — G89.29 CHRONIC BACK PAIN: ICD-10-CM

## 2019-03-11 RX ORDER — GABAPENTIN 800 MG/1
800 TABLET ORAL 3 TIMES DAILY
Qty: 90 TABLET | Refills: 3 | Status: SHIPPED | OUTPATIENT
Start: 2019-03-11 | End: 2019-07-03 | Stop reason: SDUPTHER

## 2019-05-21 ENCOUNTER — OFFICE VISIT (OUTPATIENT)
Dept: ENDOCRINOLOGY | Age: 34
End: 2019-05-21
Payer: COMMERCIAL

## 2019-05-21 VITALS
HEIGHT: 63 IN | OXYGEN SATURATION: 97 % | SYSTOLIC BLOOD PRESSURE: 98 MMHG | BODY MASS INDEX: 24.8 KG/M2 | HEART RATE: 83 BPM | DIASTOLIC BLOOD PRESSURE: 64 MMHG | WEIGHT: 140 LBS

## 2019-05-21 DIAGNOSIS — K21.9 GASTROESOPHAGEAL REFLUX DISEASE WITHOUT ESOPHAGITIS: Chronic | ICD-10-CM

## 2019-05-21 DIAGNOSIS — E10.649 UNCONTROLLED TYPE 1 DIABETES MELLITUS WITH HYPOGLYCEMIA, UNSPECIFIED HYPOGLYCEMIA COMA STATUS (HCC): Primary | ICD-10-CM

## 2019-05-21 DIAGNOSIS — M79.7 FIBROMYALGIA: Chronic | ICD-10-CM

## 2019-05-21 DIAGNOSIS — F25.0 SCHIZOAFFECTIVE DISORDER, BIPOLAR TYPE (HCC): Chronic | ICD-10-CM

## 2019-05-21 PROCEDURE — G8427 DOCREV CUR MEDS BY ELIG CLIN: HCPCS | Performed by: INTERNAL MEDICINE

## 2019-05-21 PROCEDURE — 2022F DILAT RTA XM EVC RTNOPTHY: CPT | Performed by: INTERNAL MEDICINE

## 2019-05-21 PROCEDURE — 83036 HEMOGLOBIN GLYCOSYLATED A1C: CPT | Performed by: INTERNAL MEDICINE

## 2019-05-21 PROCEDURE — 3046F HEMOGLOBIN A1C LEVEL >9.0%: CPT | Performed by: INTERNAL MEDICINE

## 2019-05-21 PROCEDURE — G8420 CALC BMI NORM PARAMETERS: HCPCS | Performed by: INTERNAL MEDICINE

## 2019-05-21 PROCEDURE — 4004F PT TOBACCO SCREEN RCVD TLK: CPT | Performed by: INTERNAL MEDICINE

## 2019-05-21 PROCEDURE — 99214 OFFICE O/P EST MOD 30 MIN: CPT | Performed by: INTERNAL MEDICINE

## 2019-05-21 RX ORDER — LURASIDONE HYDROCHLORIDE 40 MG/1
TABLET, FILM COATED ORAL
Refills: 2 | COMMUNITY
Start: 2019-05-02 | End: 2020-02-12

## 2019-05-21 ASSESSMENT — ENCOUNTER SYMPTOMS: VISUAL CHANGE: 0

## 2019-05-21 NOTE — PROGRESS NOTES
Yomaira Sosa is a 28 y.o. female here for Type 1 Diabetes . Pt was diagnosed with diabetes when she was 14years of age she had a viral infection on a vacation and was sick for almost 2 wks and was later on found to have diabetes . Pt got diabetic eduaction when she was diagnosed initialy and then didn't get any refresher . Pt was on NPH 35 and 10 of R and was switched to 40 units of NPH with 15 of R . Pt has 3 kids one set of twin , she didn't had any complications during her pregnancy and as per pt she had a good control of diabetes during her pregnancies Pt denies any change in her skin texture , denies any change in her bowel habits , denies any heat or cold intolerance , denies any recent weight change ,denies any palpitation . Pt denies any chest pain or SOB . Pt denies any numbness and tingling in her extremities    Diabetes History   Dx: DM Type I   Glucose Monitoring:   Glucose Monitoring: Yes   Glucose Testing Frequency: fasting, Test Site: fingerstick   Home Monitoring Reviewed: Verbal recall   Breakfast: Before: 230 she is not checking any other time of the day   Hypoglycemic Events:   Frequency: none   Hypoglycemia Symptoms: hunger, shaking, unawareness   Dietary Management:   Diet: no restrictions, carb counting   Exercise: Active Lifestyle   Insulin Management:   Site Rotation: yes   Injection/Injection Sites: upper arm, abdomen   Diabetic Eye Exam:   Date of last exam: jan 2014   Physician: eddy   Discussed   Comments: no retinopathy   Associated Symptoms:   Denies: anxiety, claudication, depression, non healing wounds, numbness, orthostasis, polydipsia, polyuria, sexual dysfunction, tingling, vision problems       Comorbid conditions: Neuropathy and Nephropathy  Current diabetic medications include: insulin via pump     INTERIM:    Diabetes   She presents for her follow-up diabetic visit. She has type 1 diabetes mellitus. MedicAlert identification noted.  The initial diagnosis of diabetes was made 25 last visit? No   Has there been any new school, family or social problems since the last visit? No  Has there been any new family history of members with diabetes, heart disease, stroke, or endocrine related problems since the last visit?   No    Past Medical History:   Diagnosis Date    Anemia     Anxiety     Back pain     Diabetes mellitus type 1 ([facility]     Diagnosed at 14years of age; has insulin pump    Fibromyalgia     Shoulder instability     cortizone shot for torn [facility] joint      Patient Active Problem List   Diagnosis    Anxiety     Chronic back pain    Uncontrolled type 1 diabetes mellitus ([facility]    Tobacco abuse    Fibromyalgia    Schizoaffective disorder ([facility]    Gastroesophageal reflux disease without esophagitis    Drug abuse and dependence ([facility]     Past Surgical History:   Procedure Laterality Date     SECTION      LEEP      SHOULDER SURGERY Left     TUBAL LIGATION       Social History     Socioeconomic History    Marital status: Unknown     Spouse name: Not on file    Number of children: Not on file    Years of education: Not on file    Highest education level: Not on file   Occupational History    Not on file   Social Needs    Financial resource strain: Not on file    Food insecurity:     Worry: Not on file     Inability: Not on file    Transportation needs:     Medical: Not on file     Non-medical: Not on file   Tobacco Use    Smoking status: Current Every Day Smoker     Packs/day: 1.00     Years: 12.00     Pack years: 12.00     Types: Cigarettes    Smokeless tobacco: Never Used   Substance and Sexual Activity    Alcohol use: No    Drug use: Yes     Types: Marijuana    Sexual activity: Yes     Partners: Male   Lifestyle    Physical activity:     Days per week: Not on file     Minutes per session: Not on file    Stress: Not on file   Relationships    Social connections:     Talks on phone: Not on file     Gets together: Not on file     Attends Rastafari service: Not on file     Active member of club or organization: Not on file     Attends meetings of clubs or organizations: Not on file     Relationship status: Not on file    Intimate partner violence:     Fear of current or ex partner: Not on file     Emotionally abused: Not on file     Physically abused: Not on file     Forced sexual activity: Not on file   Other Topics Concern    Not on file   Social History Narrative    Not on file     Family History   Problem Relation Age of Onset    High Blood Pressure Mother     Cancer Mother     Diabetes Mother     Depression Mother      Current Outpatient Medications   Medication Sig Dispense Refill    insulin lispro (HUMALOG) 100 UNIT/ML injection vial USE 50 UNITS VIA PUMP EVERY DAY AS DIRECTED 20 mL 0    blood glucose test strips (FREESTYLE LITE) strip USE TO TEST FOUR TIMES DAILY 150 strip 5    omeprazole (PRILOSEC) 20 MG delayed release capsule TAKE 1 CAPSULE BY MOUTH DAILY 30 capsule 2    gabapentin (NEURONTIN) 800 MG tablet Take 1 tablet by mouth 3 times daily for 180 days. 90 tablet 3    INS SYRINGE/NEEDLE .5CC/27G 27G X 1/2\" 0.5 ML MISC To be used in cse of emergency when insulin pump doesn't work 10 each 0    triamcinolone (KENALOG) 0.1 % cream APPLY TWICE DAILY 454 g 0    Lancet Devices (LANCING DEVICE) MISC Use to test BG 6 times daily, Freestyle Lite      LANCETS ULTRA THIN MISC Use to check sugar 4 times daily      hydrOXYzine (VISTARIL) 50 MG capsule Take 50 mg by mouth      ALPRAZolam (XANAX) 1 MG tablet Take 0.5 mg by mouth 2 times daily. Blease Durie glucagon 1 MG injection Inject 1 mg into the skin See Admin Instructions Follow package directions for low blood sugar. 1 kit 3    traZODone (DESYREL) 100 MG tablet Take 250-400 mg by mouth nightly       LATUDA 40 MG TABS tablet 1 tab daily  2     No current facility-administered medications for this visit.       Allergies   Allergen Reactions    Ultram [Tramadol]      Family Status   Relation Name Status    Mother      Father  Alive       Review of Systems  Constitutional: no fatigue, no fever, no recent weight gain, no recent weight loss, no changes in appetite  Eyes: no eye pain, no change in vision, no eye redness, no eye irritation, no double vision  Ears, nose, throat: no nasal congestion, no sore throat, no earache, no decrease in hearing, no hoarseness, no dry mouth, no sinus problems, no difficulty swallowing, no neck lumps, no dental problems, no mouth sores, no ringing in ears  Pulmonary: no shortness of breath, no wheezing, no dyspnea on exertion, no cough  Cardiovascular: no chest pain, no lower extremity edema, no orthopnea, no intermittent leg claudication, no palpitations  Gastrointestinal: no abdominal pain, no nausea, no vomiting, no diarrhea, no constipation, no dysphagia, no heartburn, no bloating  Genitourinary: no dysuria, no urinary incontinence, no urinary hesitancy, no urinary frequency, no feelings of urinary urgency, no nocturia  Musculoskeletal: no joint swelling, no joint stiffness, no joint pain, no muscle cramps, no muscle pain, no bone pain, no fractures  Integument/Breast: hair loss, but no skin rashes, no skin lesions, no itching, no dry skin, no breast pain, no breast mass, no skin hives, no skin discoloration, no nipple discharge  Neurological: no numbness, no tingling, no weakness, no confusion, no headaches, no dizziness, no fainting, no tremors, no decrease in memory, no balance problems  Psychiatric: no anxiety, no depression, no insomnia, no change in personality, no emotional problems, no stress  Hematologic/Lymphatic: no tendency for easy bleeding, no swollen lymph nodes, no tendency for easy bruising  Immunology: no seasonal allergies, no frequent infections, no frequent illnesses  Endocrine: no temperature intolerance no hirsutism, no hot flashes    OBJECTIVE:  BP 98/64   Pulse 83   Ht 5' 3\" (1.6 m)   Wt 140 lb (63.5 kg)   LMP 2019   SpO2 97%   BMI 24.80 kg/m²    Wt Readings from Last 3 Encounters:   05/21/19 140 lb (63.5 kg)   11/07/18 145 lb (65.8 kg)   09/20/18 138 lb (62.6 kg)       Constitutional: no acute distress, well appearing and well nourished  Psychiatric: oriented to person, place and time, judgement and insight and normal, recent and remote memory and intact and mood and affect are normal  Skin: skin and subcutaneous tissue is normal without mass, normal turgor  Head and Face: examination of head and face revealed no abnormalities  Eyes: no lid or conjunctival swelling, erythema or discharge  Ears/Nose: external inspection of ears and nose revealed no abnormalities, hearing is grossly normal  Oropharynx/Mouth/Face: lips, tongue and gums are normal with no lesions, the voice quality was normal  Neck: neck is supple and symmetric, with midline trachea and no masses, thyroid is normal  Pulmonary: no increased work of breathing or signs of respiratory distress, lungs are clear to auscultation  Cardiovascular: normal heart rate and rhythm, normal S1 and S2, and pedal pulses and 2+ bilaterally, No edema  Musculoskeletal: normal gait and station and exam of the digits and nails are normal  Neurological: normal coordination and normal general cortical function      Lab Results   Component Value Date    LABA1C 10.0 12/19/2018    LABA1C 10.0 11/07/2018    LABA1C 10.3 09/25/2018     Visual inspection:  Deformity/amputation: absent  Skin lesions/pre-ulcerative calluses: absent  Edema: right- negative, left- negative    Sensory exam:  Monofilament sensation: normal  (minimum of 5 random plantar locations tested, avoiding callused areas - > 1 area with absence of sensation is + for neuropathy)    Plus at least one of the following:  Pulses: normal,   Pinprick: Intact  Proprioception: Intact  Vibration (128 Hz): Impaired    ASSESSMENT/PLAN:  1.  Type 1 diabetes mellitus with complication uncontrolled (HCC)10.7>>9.0>>8.7>>10.2  Trend  A1C 11 in dec 2013 >>11.4 >>8.8>>9.1>>9.1>.8.7>>7.7>>8.3 >>10.6>>9.6    I reviewed Her insulin pump  download with her in detail   Her basal rate seems to be adequate she has not been checking her fingersticks more than once every other day, some of these numbers are in target so she was advised to start checking herself more often and also to take meal boluses as she is gone almost 7 days without taking any meal boluses as per the insulin pump she was advised to be compliant with that  She will contact [employer] for up grade  She has been smoking weed to help her appetite she feels like that is the only way her pain in appetite is improved but her psychiatrist just occur of some pain medication and Xanax and she feels very stressed about it   Also advised to check more often ---she was advised to take meal boluses 10 minutes before   We discussed 670 G in detail she will contact [employer] for availability   -pt uptodate on eye exam no retinopathy 2019 no retinopathy Advised to make apt   - microalb/creat elevated July 2016 normal may 2017 , normal in September 2018   On  lisinopril 2,5 mg ---we discussed kideny damage in detail with her   -foot exam performed revealed mild neuropathy may 2019 vibration reduced   She has tried Lyrica , cymbalta and currently is on 800 mg of Neurontin TID per PCP   She was on metformin in the past but it was stopped --- Last DKA in dec 2015 with pump tubing issues .   Eye exam march 2019 she is following with [facility]   --- Thyroid normal on palpation No discreet thyroid nodules identified on exam     Hyperlipidemia   ldl borderline 100 >84  She was advised to watch her diet and institute low fat diet   tfts normal     VIT D level was 26   Advised to increase supplement   She has been taking 5000 IUs daily she was advised to continue with increased dose she does note improvement in energy level     Migraine headaches   Getting worse as per pt   She was advised to discuss with her primary care physician    Chronic back pain    she was cautioned about anti-inflammatory medication and chronic kidney   She is taking antiinflammatory and it has improved     DEPRESSION/ANXIETY   Follows with psyciatry   She is not happy with all these medication change   Her xanax was reduced and she was taken off ADD medication            Reviewed and/or ordered clinical lab results Yes  Reviewed and/or ordered radiology tests Yes  Reviewed and/or ordered other diagnostic tests Yes  Made a decision to obtain old records Yes  Reviewed and summarized old records Yes      Zuly Mariee was counseled regarding symptoms of current diagnosis, course and complications of disease if inadequately treated, side effects of medications, diagnosis, treatment options, and prognosis, risks, benefits, complications, and alternatives of treatment, labs, imaging and other studies and treatment targets and goals. She understands instructions and counseling. These diagnosis were discussed and reviewed with the patient including the advantages of drug therapy. She was counseled at this visit on the following: diabetes complication prevention and foot care. Return in about 3 months (around 8/21/2019).

## 2019-05-30 ENCOUNTER — TELEPHONE (OUTPATIENT)
Dept: ENDOCRINOLOGY | Age: 34
End: 2019-05-30

## 2019-06-03 LAB — HBA1C MFR BLD: 9.2 %

## 2019-06-12 ENCOUNTER — TELEPHONE (OUTPATIENT)
Dept: ENDOCRINOLOGY | Age: 34
End: 2019-06-12

## 2019-06-12 NOTE — TELEPHONE ENCOUNTER
[facility], states that he hasn't received the 30 day test Log for the Patient, without it he cannot move forward in her approval process. He is requesting a call back from nurse asap.  [phone] ext [phone]

## 2019-06-12 NOTE — TELEPHONE ENCOUNTER
Pt needs 2 more weeks before she will have enough logs, once finished she will bring to the office. LM for Lou Rincon at [place] [address] with update.  Need him to call back with fax #

## 2019-07-02 NOTE — TELEPHONE ENCOUNTER
[facility] calling and wanting to know if pt brought in sugar logs. I noted to him I didn't see any currently.  He states he will call pt to let her know she needs to bring them in for her to get approval

## 2019-07-03 DIAGNOSIS — M54.9 CHRONIC BACK PAIN: ICD-10-CM

## 2019-07-03 DIAGNOSIS — G89.29 CHRONIC BACK PAIN: ICD-10-CM

## 2019-07-03 RX ORDER — GABAPENTIN 800 MG/1
TABLET ORAL
Qty: 90 TABLET | Refills: 3 | Status: SHIPPED | OUTPATIENT
Start: 2019-07-03 | End: 2022-04-12

## 2019-07-12 ENCOUNTER — OFFICE VISIT (OUTPATIENT)
Dept: INTERNAL MEDICINE CLINIC | Age: 34
End: 2019-07-12
Payer: COMMERCIAL

## 2019-07-12 VITALS
BODY MASS INDEX: 23.74 KG/M2 | HEART RATE: 88 BPM | WEIGHT: 134 LBS | DIASTOLIC BLOOD PRESSURE: 80 MMHG | SYSTOLIC BLOOD PRESSURE: 106 MMHG

## 2019-07-12 DIAGNOSIS — F41.1 ANXIETY STATE: ICD-10-CM

## 2019-07-12 DIAGNOSIS — E10.649 UNCONTROLLED TYPE 1 DIABETES MELLITUS WITH HYPOGLYCEMIA, UNSPECIFIED HYPOGLYCEMIA COMA STATUS (HCC): Primary | ICD-10-CM

## 2019-07-12 DIAGNOSIS — F19.20 DRUG ABUSE AND DEPENDENCE (HCC): Chronic | ICD-10-CM

## 2019-07-12 DIAGNOSIS — F25.0 SCHIZOAFFECTIVE DISORDER, BIPOLAR TYPE (HCC): Chronic | ICD-10-CM

## 2019-07-12 PROCEDURE — 99213 OFFICE O/P EST LOW 20 MIN: CPT | Performed by: INTERNAL MEDICINE

## 2019-07-12 PROCEDURE — G8420 CALC BMI NORM PARAMETERS: HCPCS | Performed by: INTERNAL MEDICINE

## 2019-07-12 PROCEDURE — 3046F HEMOGLOBIN A1C LEVEL >9.0%: CPT | Performed by: INTERNAL MEDICINE

## 2019-07-12 PROCEDURE — G8427 DOCREV CUR MEDS BY ELIG CLIN: HCPCS | Performed by: INTERNAL MEDICINE

## 2019-07-12 PROCEDURE — 4004F PT TOBACCO SCREEN RCVD TLK: CPT | Performed by: INTERNAL MEDICINE

## 2019-07-12 PROCEDURE — 2022F DILAT RTA XM EVC RTNOPTHY: CPT | Performed by: INTERNAL MEDICINE

## 2019-08-15 ENCOUNTER — TELEPHONE (OUTPATIENT)
Dept: ENDOCRINOLOGY | Age: 34
End: 2019-08-15

## 2019-08-15 NOTE — TELEPHONE ENCOUNTER
[place] [phone] [place] a replacement transmitter for their CGM.  Insurance requires office notes,current A1C,  logs

## 2019-08-20 ENCOUNTER — TELEPHONE (OUTPATIENT)
Dept: ENDOCRINOLOGY | Age: 34
End: 2019-08-20

## 2019-09-13 ENCOUNTER — TELEPHONE (OUTPATIENT)
Dept: ENDOCRINOLOGY | Age: 34
End: 2019-09-13

## 2019-09-13 NOTE — TELEPHONE ENCOUNTER
[address] [facility] [place], [place] [phone] requests a call back regarding the request they faxed over on 8/14/19 for chart notes and CGM pump supplies.

## 2019-10-23 ENCOUNTER — TELEPHONE (OUTPATIENT)
Dept: ENDOCRINOLOGY | Age: 34
End: 2019-10-23

## 2019-10-23 DIAGNOSIS — E10.649 UNCONTROLLED TYPE 1 DIABETES MELLITUS WITH HYPOGLYCEMIA, UNSPECIFIED HYPOGLYCEMIA COMA STATUS (HCC): Primary | ICD-10-CM

## 2019-10-23 RX ORDER — PERPHENAZINE 16 MG/1
TABLET, FILM COATED ORAL
Qty: 400 EACH | Refills: 5 | Status: SHIPPED | OUTPATIENT
Start: 2019-10-23 | End: 2020-01-15 | Stop reason: SDUPTHER

## 2019-10-29 ENCOUNTER — TELEPHONE (OUTPATIENT)
Dept: ENDOCRINOLOGY | Age: 34
End: 2019-10-29

## 2019-10-29 RX ORDER — AMLODIPINE BESYLATE AND BENAZEPRIL HYDROCHLORIDE 10; 20 MG/1; MG/1
1 CAPSULE ORAL DAILY
COMMUNITY
End: 2019-10-29 | Stop reason: CLARIF

## 2019-11-05 ENCOUNTER — TELEPHONE (OUTPATIENT)
Dept: ENDOCRINOLOGY | Age: 34
End: 2019-11-05

## 2019-11-05 ENCOUNTER — OFFICE VISIT (OUTPATIENT)
Dept: ENDOCRINOLOGY | Age: 34
End: 2019-11-05
Payer: COMMERCIAL

## 2019-11-05 VITALS
HEIGHT: 63 IN | SYSTOLIC BLOOD PRESSURE: 108 MMHG | BODY MASS INDEX: 24.63 KG/M2 | OXYGEN SATURATION: 98 % | HEART RATE: 85 BPM | WEIGHT: 139 LBS | DIASTOLIC BLOOD PRESSURE: 72 MMHG

## 2019-11-05 DIAGNOSIS — F41.1 ANXIETY STATE: ICD-10-CM

## 2019-11-05 DIAGNOSIS — M54.40 CHRONIC LOW BACK PAIN WITH SCIATICA, SCIATICA LATERALITY UNSPECIFIED, UNSPECIFIED BACK PAIN LATERALITY: ICD-10-CM

## 2019-11-05 DIAGNOSIS — F19.20 DRUG ABUSE AND DEPENDENCE (HCC): Chronic | ICD-10-CM

## 2019-11-05 DIAGNOSIS — F25.0 SCHIZOAFFECTIVE DISORDER, BIPOLAR TYPE (HCC): Chronic | ICD-10-CM

## 2019-11-05 DIAGNOSIS — M79.7 FIBROMYALGIA: Chronic | ICD-10-CM

## 2019-11-05 DIAGNOSIS — G89.29 CHRONIC LOW BACK PAIN WITH SCIATICA, SCIATICA LATERALITY UNSPECIFIED, UNSPECIFIED BACK PAIN LATERALITY: ICD-10-CM

## 2019-11-05 LAB — HBA1C MFR BLD: 9.4 %

## 2019-11-05 PROCEDURE — 2022F DILAT RTA XM EVC RTNOPTHY: CPT | Performed by: INTERNAL MEDICINE

## 2019-11-05 PROCEDURE — 3052F HG A1C>EQUAL 8.0%<EQUAL 9.0%: CPT | Performed by: INTERNAL MEDICINE

## 2019-11-05 PROCEDURE — G8420 CALC BMI NORM PARAMETERS: HCPCS | Performed by: INTERNAL MEDICINE

## 2019-11-05 PROCEDURE — G8427 DOCREV CUR MEDS BY ELIG CLIN: HCPCS | Performed by: INTERNAL MEDICINE

## 2019-11-05 PROCEDURE — G8484 FLU IMMUNIZE NO ADMIN: HCPCS | Performed by: INTERNAL MEDICINE

## 2019-11-05 PROCEDURE — 4004F PT TOBACCO SCREEN RCVD TLK: CPT | Performed by: INTERNAL MEDICINE

## 2019-11-05 PROCEDURE — 95251 CONT GLUC MNTR ANALYSIS I&R: CPT | Performed by: INTERNAL MEDICINE

## 2019-11-05 PROCEDURE — 83036 HEMOGLOBIN GLYCOSYLATED A1C: CPT | Performed by: INTERNAL MEDICINE

## 2019-11-05 PROCEDURE — 99214 OFFICE O/P EST MOD 30 MIN: CPT | Performed by: INTERNAL MEDICINE

## 2019-11-05 RX ORDER — GABAPENTIN 800 MG/1
800 TABLET ORAL 3 TIMES DAILY
Qty: 30 TABLET | Refills: 0 | Status: SHIPPED | OUTPATIENT
Start: 2019-11-05 | End: 2021-06-07

## 2019-11-05 ASSESSMENT — ENCOUNTER SYMPTOMS: VISUAL CHANGE: 0

## 2019-11-16 DIAGNOSIS — F19.20 DRUG ABUSE AND DEPENDENCE (HCC): Chronic | ICD-10-CM

## 2019-11-16 DIAGNOSIS — M79.7 FIBROMYALGIA: Chronic | ICD-10-CM

## 2019-11-16 DIAGNOSIS — F25.0 SCHIZOAFFECTIVE DISORDER, BIPOLAR TYPE (HCC): Chronic | ICD-10-CM

## 2019-11-18 RX ORDER — GABAPENTIN 800 MG/1
TABLET ORAL
Qty: 30 TABLET | Refills: 0 | OUTPATIENT
Start: 2019-11-18

## 2019-11-21 ENCOUNTER — TELEPHONE (OUTPATIENT)
Dept: ENDOCRINOLOGY | Age: 34
End: 2019-11-21

## 2019-11-21 RX ORDER — BLOOD-GLUCOSE METER
KIT MISCELLANEOUS
Qty: 150 STRIP | Refills: 0 | Status: SHIPPED | OUTPATIENT
Start: 2019-11-21 | End: 2021-03-11

## 2019-12-10 ENCOUNTER — TELEPHONE (OUTPATIENT)
Dept: ENDOCRINOLOGY | Age: 34
End: 2019-12-10

## 2019-12-10 NOTE — LETTER
[facility]  [address] [place] [address] [id number]  [address] [place] [address]  Phone: [phone]  Fax: [phone]    Kiera Barlow MD        December 10, 2019     Patient: Sanju Jacques   YOB: 1985           To Whom it May Concern:    Lola Prado is under my care for Type 1 Diabetes, please allow her to take a break during periods of hyperglycemia or hypoglycemia. If you have any questions or concerns, please don't hesitate to call.     Sincerely,         Kiera Barlow MD

## 2020-01-07 ENCOUNTER — TELEPHONE (OUTPATIENT)
Dept: ENDOCRINOLOGY | Age: 35
End: 2020-01-07

## 2020-01-08 ENCOUNTER — TELEPHONE (OUTPATIENT)
Dept: ENDOCRINOLOGY | Age: 35
End: 2020-01-08

## 2020-01-14 NOTE — TELEPHONE ENCOUNTER
Letter in mail from [address] [place] [address] approving insulin pump supplies from 2-5-20 to 2-4-21

## 2020-01-15 RX ORDER — PERPHENAZINE 16 MG/1
TABLET, FILM COATED ORAL
Qty: 400 EACH | Refills: 5 | Status: SHIPPED | OUTPATIENT
Start: 2020-01-15 | End: 2020-06-30

## 2020-02-12 ENCOUNTER — OFFICE VISIT (OUTPATIENT)
Dept: ENDOCRINOLOGY | Age: 35
End: 2020-02-12
Payer: COMMERCIAL

## 2020-02-12 VITALS
HEART RATE: 90 BPM | SYSTOLIC BLOOD PRESSURE: 100 MMHG | BODY MASS INDEX: 26.4 KG/M2 | HEIGHT: 63 IN | DIASTOLIC BLOOD PRESSURE: 66 MMHG | WEIGHT: 149 LBS | OXYGEN SATURATION: 99 %

## 2020-02-12 LAB — HBA1C MFR BLD: 9.2 %

## 2020-02-12 PROCEDURE — G8427 DOCREV CUR MEDS BY ELIG CLIN: HCPCS | Performed by: INTERNAL MEDICINE

## 2020-02-12 PROCEDURE — 2022F DILAT RTA XM EVC RTNOPTHY: CPT | Performed by: INTERNAL MEDICINE

## 2020-02-12 PROCEDURE — 4004F PT TOBACCO SCREEN RCVD TLK: CPT | Performed by: INTERNAL MEDICINE

## 2020-02-12 PROCEDURE — 83036 HEMOGLOBIN GLYCOSYLATED A1C: CPT | Performed by: INTERNAL MEDICINE

## 2020-02-12 PROCEDURE — G8419 CALC BMI OUT NRM PARAM NOF/U: HCPCS | Performed by: INTERNAL MEDICINE

## 2020-02-12 PROCEDURE — G8484 FLU IMMUNIZE NO ADMIN: HCPCS | Performed by: INTERNAL MEDICINE

## 2020-02-12 PROCEDURE — 3046F HEMOGLOBIN A1C LEVEL >9.0%: CPT | Performed by: INTERNAL MEDICINE

## 2020-02-12 PROCEDURE — 95251 CONT GLUC MNTR ANALYSIS I&R: CPT | Performed by: INTERNAL MEDICINE

## 2020-02-12 PROCEDURE — 99214 OFFICE O/P EST MOD 30 MIN: CPT | Performed by: INTERNAL MEDICINE

## 2020-02-12 RX ORDER — VORTIOXETINE 10 MG/1
TABLET, FILM COATED ORAL
COMMUNITY
Start: 2020-02-04 | End: 2020-06-30 | Stop reason: SINTOL

## 2020-02-12 RX ORDER — PRAMIPEXOLE DIHYDROCHLORIDE 0.25 MG/1
TABLET ORAL
COMMUNITY
Start: 2020-01-06 | End: 2020-06-30

## 2020-02-12 RX ORDER — IBUPROFEN 800 MG/1
TABLET ORAL
COMMUNITY
Start: 2020-02-06

## 2020-02-12 RX ORDER — GUANFACINE 2 MG/1
TABLET, EXTENDED RELEASE ORAL
COMMUNITY
Start: 2019-12-23

## 2020-02-12 RX ORDER — ALPRAZOLAM 1 MG/1
TABLET ORAL
COMMUNITY
Start: 2020-02-06 | End: 2021-06-07

## 2020-02-12 RX ORDER — HYDROXYZINE 50 MG/1
50 TABLET, FILM COATED ORAL 3 TIMES DAILY PRN
COMMUNITY
End: 2020-06-30 | Stop reason: SDUPTHER

## 2020-02-12 RX ORDER — FERROUS SULFATE 325(65) MG
TABLET ORAL
COMMUNITY
Start: 2020-02-02

## 2020-02-12 ASSESSMENT — ENCOUNTER SYMPTOMS: VISUAL CHANGE: 0

## 2020-02-12 NOTE — PROGRESS NOTES
average, 3 meals per day  Current exercise: rare    Has there been any hospitalization, surgery or major illness since the last visit? No   Has there been any new school, family or social problems since the last visit? No  Has there been any new family history of members with diabetes, heart disease, stroke, or endocrine related problems since the last visit?   No    Past Medical History:   Diagnosis Date    Anemia     Anxiety     Back pain     Diabetes mellitus type 1 ([facility]     Diagnosed at 14years of age; has insulin pump    Fibromyalgia     Shoulder instability     cortizone shot for torn [facility] joint      Patient Active Problem List   Diagnosis    Anxiety     Chronic back pain    Type 1 diabetes, uncontrolled, with [facility] ([facility])    Tobacco abuse    Fibromyalgia    [facility]    Gastroesophageal reflux disease without esophagitis    Drug abuse and dependence ([facility]     Past Surgical History:   Procedure Laterality Date     SECTION      LEEP      SHOULDER SURGERY Left     TUBAL LIGATION       Social History     Socioeconomic History    Marital status: Unknown     Spouse name: Not on file    Number of children: Not on file    Years of education: Not on file    Highest education level: Not on file   Occupational History    Not on file   Social Needs    Financial resource strain: Not on file    Food insecurity:     Worry: Not on file     Inability: Not on file    Transportation needs:     Medical: Not on file     Non-medical: Not on file   Tobacco Use    Smoking status: Current Every Day Smoker     Packs/day: 1.00     Years: 12.00     Pack years: 12.00     Types: Cigarettes    Smokeless tobacco: Never Used   Substance and Sexual Activity    Alcohol use: No    Drug use: Yes     Types: Marijuana    Sexual activity: Yes     Partners: Male   Lifestyle    Physical activity:     Days per week: Not on file     Minutes per session: Not on file    Stress: Not on file   Relationships    Social connections:     Talks on phone: Not on file     Gets together: Not on file     Attends Jehovah's witness service: Not on file     Active member of club or organization: Not on file     Attends meetings of clubs or organizations: Not on file     Relationship status: Not on file    Intimate partner violence:     Fear of current or ex partner: Not on file     Emotionally abused: Not on file     Physically abused: Not on file     Forced sexual activity: Not on file   Other Topics Concern    Not on file   Social History Narrative    Not on file     Family History   Problem Relation Age of Onset    High Blood Pressure Mother     Cancer Mother     Diabetes Mother     Depression Mother      Current Outpatient Medications   Medication Sig Dispense Refill    ALPRAZolam (XANAX) 1 MG tablet TAKE 1 TABLET BY MOUTH 4 TIMES A DAY      TRINTELLIX 10 MG TABS tablet TAKE 1 TABLET BY MOUTH EVERY DAY      ferrous sulfate 325 (65 Fe) MG tablet TAKE 1 TABLET BY MOUTH EVERY DAY      pramipexole (MIRAPEX) 0.25 MG tablet daily      guanFACINE (INTUNIV) 2 MG TB24 extended release tablet TK 1 T PO QD      hydrOXYzine (ATARAX) 50 MG tablet Take 50 mg by mouth 3 times daily as needed      ibuprofen (ADVIL;MOTRIN) 800 MG tablet TAKE 1 TABLET BY MOUTH THREE TIMES A DAY      ADMELOG 100 UNIT/ML injection vial USE 50 UNITS VIA PUMP EVERY DAY AS DIRECTED 20 mL 2    CONTOUR NEXT TEST strip Check blood sugar  each 5    FREESTYLE LITE strip USE TO TEST FOUR TIMES DAILY 150 strip 0    omeprazole (PRILOSEC) 20 MG delayed release capsule TAKE 1 CAPSULE BY MOUTH DAILY 30 capsule 3    blood glucose test strips (FREESTYLE LITE) strip USE TO TEST FOUR TIMES DAILY 150 strip 5    INS SYRINGE/NEEDLE .5CC/27G 27G X 1/2\" 0.5 ML MISC To be used in cse of emergency when insulin pump doesn't work 10 each 0    Lancet Devices (LANCING DEVICE) MISC Use to test BG 6 times daily, Freestyle Lite      LANCETS ULTRA THIN MISC Use to check sugar 4 times daily      hydrOXYzine (VISTARIL) 50 MG capsule Take 50 mg by mouth      glucagon 1 MG injection Inject 1 mg into the skin See Admin Instructions Follow package directions for low blood sugar. 1 kit 3    gabapentin (NEURONTIN) 800 MG tablet Take 1 tablet by mouth 3 times daily for 10 days. 30 tablet 0    gabapentin (NEURONTIN) 800 MG tablet TAKE 1 TABLET BY MOUTH THREE TIMES DAILY 90 tablet 3     No current facility-administered medications for this visit. Allergies   Allergen Reactions    Ultram [Tramadol]      Family Status   Relation Name Status    Mother      Father  Alive       Review of Systems  I have reviewed the review of system questionnaire filled by the patient .   Patient was advised to contact PCP for non endocrine signs and symptoms       OBJECTIVE:  /66   Pulse 90   Ht 5' 3\" (1.6 m)   Wt 149 lb (67.6 kg)   LMP 2020   SpO2 99%   BMI 26.39 kg/m²    Wt Readings from Last 3 Encounters:   20 149 lb (67.6 kg)   19 139 lb (63 kg)   19 134 lb (60.8 kg)       Constitutional: no acute distress, well appearing, well nourished  Psychiatric: oriented to person, place and time, judgement, insight and normal, recent and remote memory and intact and mood, affect are normal  Skin: skin and subcutaneous tissue is normal without mass,   Head and Face: examination of head and face revealed no abnormalities  Eyes: no lid or conjunctival swelling, no erythema or discharge, pupils are normal,   Ears/Nose: external inspection of ears and nose revealed no abnormalities, hearing is grossly normal  Oropharynx/Mouth/Face: lips, tongue and gums are normal with no lesions, the voice quality was normal  Neck: neck is supple and symmetric, with midline trachea and no masses, thyroid is normal    Pulmonary: no increased work of breathing or signs of respiratory distress, lungs are clear to auscultation  Cardiovascular: normal heart rate and rhythm, normal S1 and S2,   Musculoskeletal: normal gait and station,   Neurological: normal coordination, normal general cortical function      Lab Results   Component Value Date    LABA1C 9.2 02/12/2020    LABA1C 9.4 11/05/2019    LABA1C 9.2 06/03/2019         ASSESSMENT/PLAN:      1. Type 1 diabetes mellitus with complication uncontrolled (HCC)10.7>>9.0>>8.7>>10.2  Trend  A1C 11 in dec 2013 >>11.4 >>8.8>>9.1>>9.1>.8.7>>7.7>>8.3 >>10.6>>9.6>>9.4>>9.4    I reviewed Her insulin pump  download with her in detail    is helping her take care of her continuous glucose sensor  I advised the  not to make too many changes in her basal rates and to upload in 2 weeks so I can make some recommendations  Advised to start taking meal boluses 10 to 15 minutes ahead of meals  Encouraged to do more accurate carbohydrate counting  She has gone a few days without bolusing, which according to the patient she had a stomach virus and she did not eat for a few days  Adjusted her sensitivity to 80 as she was having post meal hypoglycemia  I advised her to get her blood work done before her next appointment  Health maintenance  -pt uptodate on eye exam no retinopathy 2019   - microalb/creat elevated July 2016 normal may 2017 , normal in September 2018   On  lisinopril 2,5 mg ---we discussed kideny damage in detail with her   -foot exam performed revealed mild neuropathy may 2019 vibration reduced   She has tried Lyrica , cymbalta and currently is on 800 mg of Neurontin TID per PCP she asked for a refill as she is going to see a new primary care physician she was advised that I will only do one prescription with no refill   she was on metformin in the past but it was stopped --- Last DKA in dec 2015 with pump tubing issues .   Eye exam march 2019 she is [facility] with [facility] eye [facility]   --- Thyroid normal on palpation No discreet thyroid nodules identified on exam     Gabapentin 800 mg TID she asked for prescription as she CGMS Report     CGMS data collection was performed on November 5, 2019  Patient provided information on her  diet, activities and insulin dosing  during this period. Data was available for 6 days     Sensor Data Report:   - 12 AM to 6 AM: Overnight blood glucose pattern shows some hypoglycemia  - 6   AM to 10 AM:  Post breakfast hyperglycemia is noted  --10AM to 5 PM : Occasional post bolus hypoglycemia observed during this time. - 5   PM to 8 PM: Post meal hyperglycemia is noted       Average reading to 240 mg/dL     % of time <70 mg/dL   9 %   % of time >180mg/dL 76%   % of time within range 15%  Number of hypoglycemia episodes noted: 6+     Impression:   CGMS shows overnight hyperglycemia     Recommendation:      Patient was advised to make the following changes in her diabetic regimen  I increased overnight basal relates  Patient was advised not to take frequent boluses to correct hypoglycemia.    Advised to start taking meal boluses 10 to 15 minutes ahead of meals  Adjusted carb to insulin ratio

## 2020-04-15 ENCOUNTER — TELEPHONE (OUTPATIENT)
Dept: ENDOCRINOLOGY | Age: 35
End: 2020-04-15

## 2020-06-26 ENCOUNTER — TELEPHONE (OUTPATIENT)
Dept: ENDOCRINOLOGY | Age: 35
End: 2020-06-26

## 2020-06-26 LAB
AVERAGE GLUCOSE: NORMAL
HBA1C MFR BLD: 8.9 %

## 2020-06-26 NOTE — TELEPHONE ENCOUNTER
[facility] needs a call back to dispense humalog in place of Admelog. The Insurance has stopped covering the Admelog.  [phone]

## 2020-06-30 ENCOUNTER — OFFICE VISIT (OUTPATIENT)
Dept: ENDOCRINOLOGY | Age: 35
End: 2020-06-30
Payer: COMMERCIAL

## 2020-06-30 PROCEDURE — 99443 PR PHYS/QHP TELEPHONE EVALUATION 21-30 MIN: CPT | Performed by: INTERNAL MEDICINE

## 2020-06-30 RX ORDER — BLOOD SUGAR DIAGNOSTIC
1 STRIP MISCELLANEOUS 3 TIMES DAILY
Qty: 100 EACH | Refills: 3 | Status: SHIPPED | OUTPATIENT
Start: 2020-06-30 | End: 2021-06-07

## 2020-06-30 RX ORDER — GABAPENTIN 800 MG/1
TABLET ORAL
COMMUNITY
Start: 2020-06-04 | End: 2022-04-12

## 2020-06-30 ASSESSMENT — ENCOUNTER SYMPTOMS: VISUAL CHANGE: 0

## 2020-06-30 NOTE — PROGRESS NOTES
package directions for low blood sugar. 1 kit 3    gabapentin (NEURONTIN) 800 MG tablet Take 1 tablet by mouth 3 times daily for 10 days. 30 tablet 0    gabapentin (NEURONTIN) 800 MG tablet TAKE 1 TABLET BY MOUTH THREE TIMES DAILY 90 tablet 3     No current facility-administered medications for this visit. Allergies   Allergen Reactions    Ultram [Tramadol]      Family Status   Relation Name Status    Mother      Father  Alive       Review of Systems  I have reviewed the review of system questionnaire filled by the patient . Patient was advised to contact PCP for non endocrine signs and symptoms       OBJECTIVE:  There were no vitals taken for this visit.    Wt Readings from Last 3 Encounters:   20 149 lb (67.6 kg)   19 139 lb (63 kg)   19 134 lb (60.8 kg)       Constitutional: no acute distress, well appearing, well nourished  Psychiatric: oriented to person, place and time, judgement, insight and normal, recent and remote memory and intact and mood, affect are normal  Skin: skin and subcutaneous tissue is normal without mass,   Head and Face: examination of head and face revealed no abnormalities  Eyes: no lid or conjunctival swelling, no erythema or discharge, pupils are normal,   Ears/Nose: external inspection of ears and nose revealed no abnormalities, hearing is grossly normal  Oropharynx/Mouth/Face: lips, tongue and gums are normal with no lesions, the voice quality was normal  Neck: neck is supple and symmetric, with midline trachea and no masses, thyroid is normal    Pulmonary: no increased work of breathing or signs of respiratory distress, lungs are clear to auscultation  Cardiovascular: normal heart rate and rhythm, normal S1 and S2,   Musculoskeletal: normal gait and station,   Neurological: normal coordination, normal general cortical function      Lab Results   Component Value Date    LABA1C 9.2 2020    LABA1C 9.4 2019    LABA1C 9.2 2019

## 2020-07-01 ENCOUNTER — TELEPHONE (OUTPATIENT)
Dept: ENDOCRINOLOGY | Age: 35
End: 2020-07-01

## 2020-07-01 NOTE — TELEPHONE ENCOUNTER
Fax from [phone]. Please complete the CGM referral w/ Physicians order form

## 2020-08-03 ENCOUNTER — TELEPHONE (OUTPATIENT)
Dept: ENDOCRINOLOGY | Age: 35
End: 2020-08-03

## 2020-08-03 NOTE — TELEPHONE ENCOUNTER
Left message for patient to download pump for review and to call office back with her [employer] account username and password so that we can link to her account.

## 2020-08-11 ENCOUNTER — TELEPHONE (OUTPATIENT)
Dept: ENDOCRINOLOGY | Age: 35
End: 2020-08-11

## 2020-08-11 NOTE — TELEPHONE ENCOUNTER
----- Message from Keagan Levine MD sent at 6/30/2020 10:56 AM EDT -----  Please ask her to get pump downloaded for review

## 2020-08-27 ENCOUNTER — TELEPHONE (OUTPATIENT)
Dept: ENDOCRINOLOGY | Age: 35
End: 2020-08-27
Payer: COMMERCIAL

## 2020-08-27 PROCEDURE — 95251 CONT GLUC MNTR ANALYSIS I&R: CPT | Performed by: INTERNAL MEDICINE

## 2020-08-28 NOTE — TELEPHONE ENCOUNTER
Diabetes Continuous Glucose Monitoring Report         Reason for Study:     - improve diabetic control without risk of hypoglycemia       Current Medication regimen:   Insulin pump 630      CGMS Report     CGMS data collection was performed on 8/27/2020  Patient provided information on her  diet, activities and insulin dosing  during this period. Data was available for  11 days     Sensor Data Report:   - 12 AM to 6 AM: Overnight blood glucose pattern shows wide fluctuation but some low days   - 6   AM to 10 AM:  Post breakfast hyperglycemia  is noted  --10AM to 5 PM : no  hypoglycemia observed during this time.   - 5   PM to 8 PM: Post meal  Hyperglycemia is noted       Average reading  211mg/dL     Standard Dev 36   mg/dL   % of time <70 mg/dL 0 %   % of time >180  mg/dL75 %   % of time within range 25%  Number of hypoglycemia episodes noted: 0     Impression:   CGMS shows wide fluctuation belle after meals      Recommendation:      Patient was advised to make the following changes in her diabetic regimen  Reduce MN basal rate to 1.30  6 am basal rate t0 1.25   Take meal boluses 10 min before eating   Change CIR to 9:1

## 2020-12-22 ENCOUNTER — TELEPHONE (OUTPATIENT)
Dept: ENDOCRINOLOGY | Age: 35
End: 2020-12-22

## 2020-12-22 NOTE — TELEPHONE ENCOUNTER
PT has had to use more of her Humalog insulin then normal with having Covid. She would like to have more insulin sent to [facility] on [address] in [place].   CB #[phone]

## 2020-12-28 ENCOUNTER — VIRTUAL VISIT (OUTPATIENT)
Dept: ENDOCRINOLOGY | Age: 35
End: 2020-12-28
Payer: COMMERCIAL

## 2020-12-28 ENCOUNTER — TELEPHONE (OUTPATIENT)
Dept: ENDOCRINOLOGY | Age: 35
End: 2020-12-28

## 2020-12-28 PROCEDURE — 99443 PR PHYS/QHP TELEPHONE EVALUATION 21-30 MIN: CPT | Performed by: INTERNAL MEDICINE

## 2020-12-28 ASSESSMENT — ENCOUNTER SYMPTOMS: VISUAL CHANGE: 0

## 2020-12-28 NOTE — PROGRESS NOTES
Tessa Schwab is a 38 y.o. female here for uncontrolled type 1 Diabetes . Pt was diagnosed with diabetes when she was 14years of age she had a viral infection on a vacation and was sick for almost 2 wks and was later on found to have diabetes . Pt got diabetic eduaction when she was diagnosed initialy and then didn't get any refresher . Pt was on NPH 35 and 10 of R and was switched to 40 units of NPH with 15 of R . Pt has 3 kids one set of twin , she didn't had any complications during her pregnancy and as per pt she had a good control of diabetes during her pregnancies Pt denies any change in her skin texture , denies any change in her bowel habits , denies any heat or cold intolerance , denies any recent weight change ,denies any palpitation . Pt denies any chest pain or SOB . Pt denies any numbness and tingling in her extremities    Diabetes History   Dx: DM Type I   Glucose Monitoring:   Glucose Monitoring: Yes   Glucose Testing Frequency: fasting, Test Site: fingerstick   Home Monitoring Reviewed: Verbal recall   Breakfast: Before: 230 she is not checking any other time of the day   Hypoglycemic Events:   Frequency: none   Hypoglycemia Symptoms: hunger, shaking, unawareness   Dietary Management:   Diet: no restrictions, carb counting   Exercise: Active Lifestyle   Insulin Management:   Site Rotation: yes   Injection/Injection Sites: upper arm, abdomen   Diabetic Eye Exam:   Date of last exam: jan 2014   Physician: eddy   Discussed   Comments: no retinopathy   Associated Symptoms:   Denies: anxiety, claudication, depression, non healing wounds, numbness, orthostasis, polydipsia, polyuria, sexual dysfunction, tingling, vision problems       Comorbid conditions: Neuropathy and Nephropathy  Current diabetic medications include: insulin via pump     INTERIM:    Diabetes  She presents for her follow-up diabetic visit. She has type 1 diabetes mellitus. MedicAlert identification noted.  The initial diagnosis of diabetes Attends meetings of clubs or organizations: Not on file     Relationship status: Not on file    Intimate partner violence     Fear of current or ex partner: Not on file     Emotionally abused: Not on file     Physically abused: Not on file     Forced sexual activity: Not on file   Other Topics Concern    Not on file   Social History Narrative    Not on file     Family History   Problem Relation Age of Onset    High Blood Pressure Mother     Cancer Mother     Diabetes Mother     Depression Mother      Current Outpatient Medications   Medication Sig Dispense Refill    insulin lispro (HUMALOG) 100 UNIT/ML injection vial  UNITS VIA PUMP EVERY DAY AS DIRECTED 30 mL 3    gabapentin (NEURONTIN) 800 MG tablet TAKE 1 TABLET BY MOUTH THREE TIMES A DAY      Blood Glucose Monitoring Suppl (CONTOUR MONITOR) w/Device KIT plz give the one that links with her insulin pump 1 kit 0    blood glucose test strips (EXACTECH TEST) strip Inject 1 each into the skin 3 times daily As needed. 100 each 3    ALPRAZolam (XANAX) 1 MG tablet TAKE 1 TABLET BY MOUTH 4 TIMES A DAY      ferrous sulfate 325 (65 Fe) MG tablet TAKE 1 TABLET BY MOUTH EVERY DAY      guanFACINE (INTUNIV) 2 MG TB24 extended release tablet TK 1 T PO QD      ibuprofen (ADVIL;MOTRIN) 800 MG tablet TAKE 1 TABLET BY MOUTH THREE TIMES A DAY      FREESTYLE LITE strip USE TO TEST FOUR TIMES DAILY 150 strip 0    omeprazole (PRILOSEC) 20 MG delayed release capsule TAKE 1 CAPSULE BY MOUTH DAILY 30 capsule 3    INS SYRINGE/NEEDLE .5CC/27G 27G X 1/2\" 0.5 ML MISC To be used in cse of emergency when insulin pump doesn't work 10 each 0    LANCETS ULTRA THIN MISC Use to check sugar 4 times daily      hydrOXYzine (VISTARIL) 50 MG capsule Take 50 mg by mouth 3 times daily as needed       glucagon 1 MG injection Inject 1 mg into the skin See Admin Instructions Follow package directions for low blood sugar.  1 kit 3    gabapentin (NEURONTIN) 800 MG tablet Take 1 tablet by mouth 3 times daily for 10 days. 30 tablet 0    gabapentin (NEURONTIN) 800 MG tablet TAKE 1 TABLET BY MOUTH THREE TIMES DAILY 90 tablet 3     No current facility-administered medications for this visit. Allergies   Allergen Reactions    Ultram [Tramadol]      Family Status   Relation Name Status    Mother      Father  Alive       OBJECTIVE:  There were no vitals taken for this visit. Wt Readings from Last 3 Encounters:   20 149 lb (67.6 kg)   19 139 lb (63 kg)   19 134 lb (60.8 kg)       Patient is  alert oriented to time ,place and person. Both recent and remote memory intact . Patient has weighed themselves in the last few  weeks and it has been stable       Lab Results   Component Value Date    LABA1C 8.9 2020    LABA1C 9.2 2020    LABA1C 9.4 2019         ASSESSMENT/PLAN:      1.  Type 1 diabetes mellitus with complication uncontrolled   Patient has not done her labs, when she had Covid infection in December she needed more insulin and she ran out of her prescription  She will bring her insulin pump tomorrow for download  Her A1c has improved slightly  She denies any unexplained hypoglycemia  Advised to start taking meal boluses 10 to 15 minutes ahead of meals  Encouraged to do more accurate carbohydrate counting  She has some bolus related hypoglycemia she will bring her pump for download tomorrow  Adjusted her sensitivity to 80 as she was having post meal hypoglycemia    Health maintenance  -pt uptodate on eye exam no retinopathy    - microalb/creat elevated 2016 normal may 2017 , normal in 2018   On  lisinopril 2,5 mg ---we discussed kideny damage in detail with her   -foot exam performed revealed mild neuropathy may 2019 vibration reduced   She has tried Lyrica , cymbalta and currently is on 800 mg of Neurontin TID per PCP she asked for a refill as she is going to see a new primary care physician she was advised that I will only do one prescription with no refill   she was on metformin in the past but it was stopped --- Last DKA in dec 2015 with pump tubing issues . Eye exam march 2019 she is [facility] with [facility]   --- Thyroid normal on palpation No discreet thyroid nodules identified on exam     Gabapentin 800 mg TID   All possible Side effects were discussed in detail with patient in detail and patient was advised to call our office if she  notes any side effects shewas given are written handout detailing side effects from the medication. Patient tells me she has been on this dose for years and has no side effects from this medication    Hyperlipidemia   ldl borderline 100 >84  She was advised to watch her diet and stick with low fat diet   tfts normal.    VIT D level was 26   Advised to increase supplement   She has been taking 5000 IUs daily she was advised to continue with increased dose she does note improvement in energy level     Migraine headaches    stable now    Chronic back pain    she was cautioned about anti-inflammatory medication and chronic kidney   She is taking antiinflammatory and it has improved     DEPRESSION/ANXIETY   Follows with psychiatry. Reviewed and/or ordered clinical lab results Yes  Reviewed and/or ordered radiology tests Yes  Reviewed and/or ordered other diagnostic tests Yes  Made a decision to obtain old records Yes  Reviewed and summarized old records Yes      Bill Carrizales was counseled regarding symptoms of current diagnosis, course and complications of disease if inadequately treated, side effects of medications, diagnosis, treatment options, and prognosis, risks, benefits, complications, and alternatives of treatment, labs, imaging and other studies and treatment targets and goals. She understands instructions and counseling. These diagnosis were discussed and reviewed with the patient including the advantages of drug therapy.  She was counseled at this visit on the following: diabetes complication prevention and foot care      Return in about 3 months (around 3/28/2021). This was a phone conversation in [place] of in-person visit due to coronavirus emergency. Patient provided verbal consent for an \"over the phone visit'. Total time spent  22 + minutes on this call conducting an interview, performing a limited exam by phone and educating the patient on my assessment and plan.

## 2021-03-11 ENCOUNTER — TELEPHONE (OUTPATIENT)
Dept: ENDOCRINOLOGY | Age: 36
End: 2021-03-11

## 2021-03-11 RX ORDER — CARVEDILOL 25 MG/1
TABLET, FILM COATED ORAL
Qty: 200 EACH | Refills: 5 | Status: SHIPPED | OUTPATIENT
Start: 2021-03-11 | End: 2021-03-11 | Stop reason: SDUPTHER

## 2021-03-11 RX ORDER — CARVEDILOL 25 MG/1
TABLET, FILM COATED ORAL
Qty: 200 EACH | Refills: 5 | Status: SHIPPED | OUTPATIENT
Start: 2021-03-11 | End: 2022-02-23 | Stop reason: SDUPTHER

## 2021-03-11 NOTE — TELEPHONE ENCOUNTER
PT needs to have a prescription for a new Contour meter and also needs to have the test strips sent to the pharmacy  sent to [facility] on [address] in [address], [address]. PT tests 4 to 5 times a day.

## 2021-03-11 NOTE — TELEPHONE ENCOUNTER
PT would like to have the contour meter and test strips sent to [facility] in [place].   [facility] does not carry that brand of meter

## 2021-03-12 NOTE — TELEPHONE ENCOUNTER
Fax from [facility] regarding a script clarification for the contour monitor and a fax regarding a alternative request for the contour next test strip    LOV:  12/28/21     FOV:  ---

## 2021-04-15 ENCOUNTER — TELEPHONE (OUTPATIENT)
Dept: ENDOCRINOLOGY | Age: 36
End: 2021-04-15

## 2021-05-27 ENCOUNTER — TELEPHONE (OUTPATIENT)
Dept: ENDOCRINOLOGY | Age: 36
End: 2021-05-27

## 2021-06-07 ENCOUNTER — VIRTUAL VISIT (OUTPATIENT)
Dept: ENDOCRINOLOGY | Age: 36
End: 2021-06-07
Payer: COMMERCIAL

## 2021-06-07 DIAGNOSIS — F41.1 ANXIETY STATE: ICD-10-CM

## 2021-06-07 DIAGNOSIS — F25.0 SCHIZOAFFECTIVE DISORDER, BIPOLAR TYPE (HCC): ICD-10-CM

## 2021-06-07 PROCEDURE — 2022F DILAT RTA XM EVC RTNOPTHY: CPT | Performed by: INTERNAL MEDICINE

## 2021-06-07 PROCEDURE — 3052F HG A1C>EQUAL 8.0%<EQUAL 9.0%: CPT | Performed by: INTERNAL MEDICINE

## 2021-06-07 PROCEDURE — G8427 DOCREV CUR MEDS BY ELIG CLIN: HCPCS | Performed by: INTERNAL MEDICINE

## 2021-06-07 PROCEDURE — 99213 OFFICE O/P EST LOW 20 MIN: CPT | Performed by: INTERNAL MEDICINE

## 2021-06-07 ASSESSMENT — ENCOUNTER SYMPTOMS: VISUAL CHANGE: 0

## 2021-06-07 NOTE — PROGRESS NOTES
Adryan Rojas is a 38 y.o. female here for uncontrolled type 1 Diabetes . Pt was diagnosed with diabetes when she was 14years of age she had a viral infection on a vacation and was sick for almost 2 wks and was later on found to have diabetes . Pt got diabetic eduaction when she was diagnosed initialy and then didn't get any refresher . Pt was on NPH 35 and 10 of R and was switched to 40 units of NPH with 15 of R . Pt has 3 kids one set of twin , she didn't had any complications during her pregnancy and as per pt she had a good control of diabetes during her pregnancies Pt denies any change in her skin texture , denies any change in her bowel habits , denies any heat or cold intolerance , denies any recent weight change ,denies any palpitation . Pt denies any chest pain or SOB . Pt denies any numbness and tingling in her extremities    Diabetes History   Dx: DM Type I   Glucose Monitoring:   Glucose Monitoring: Yes   Glucose Testing Frequency: fasting, Test Site: fingerstick   Home Monitoring Reviewed: Verbal recall   Breakfast: Before: 230 she is not checking any other time of the day   Hypoglycemic Events:   Frequency: none   Hypoglycemia Symptoms: hunger, shaking, unawareness   Dietary Management:   Diet: no restrictions, carb counting   Exercise: Active Lifestyle   Insulin Management:   Site Rotation: yes   Injection/Injection Sites: upper arm, abdomen   Diabetic Eye Exam:   Date of last exam: jan 2014   Physician: eddy   Discussed   Comments: no retinopathy   Associated Symptoms:   Denies: anxiety, claudication, depression, non healing wounds, numbness, orthostasis, polydipsia, polyuria, sexual dysfunction, tingling, vision problems       Comorbid conditions: Neuropathy and Nephropathy  Current diabetic medications include: insulin via pump     INTERIM:    Diabetes  She presents for her follow-up diabetic visit. She has type 1 diabetes mellitus. MedicAlert identification noted.  The initial diagnosis of diabetes was made 22 years ago. Her disease course has been stable. Hypoglycemia symptoms include confusion, dizziness, nervousness/anxiousness and sweats. Associated symptoms include polyuria. Pertinent negatives for diabetes include no visual change, no weakness and no weight loss. There are no hypoglycemic complications. Pertinent negatives for hypoglycemia complications include no hospitalization, no nocturnal hypoglycemia, no required assistance and no required glucagon injection. Symptoms are stable. Diabetic complications include peripheral neuropathy. Risk factors for coronary artery disease include dyslipidemia and diabetes mellitus. Current diabetic treatment includes insulin pump. She is compliant with treatment most of the time. Her weight is stable. She is following a diabetic diet. Meal planning includes avoidance of concentrated sweets. She has not had a previous visit with a dietitian. She rarely participates in exercise. There is no change in her home blood glucose trend. Her breakfast blood glucose is taken between 7-8 am. Her breakfast blood glucose range is generally 110-130 mg/dl. Her lunch blood glucose is taken between 12-1 pm. Her lunch blood glucose range is generally 110-130 mg/dl. Her dinner blood glucose is taken between 7-8 pm. Her dinner blood glucose range is generally 140-180 mg/dl. An ACE inhibitor/angiotensin II receptor blocker is being taken. She does not see a podiatrist.Eye exam is current. Other  Pertinent negatives include no visual change or weakness. Had Covid in December and is now feeling better, she had to use double the amount of insulin during that time but now insulin requirement back to normal  But now her glucose are better  She was having lows   Weight trend: stable  Prior visit with dietician: yes  Current diet: on average, 3 meals per day  Current exercise: rare    Has there been any hospitalization, surgery or major illness since the last visit?  No   Has there been any new school, family or social problems since the last visit? No  Has there been any new family history of members with diabetes, heart disease, stroke, or endocrine related problems since the last visit? No    Past Medical History:   Diagnosis Date    Anemia     Anxiety     Back pain     Diabetes mellitus type 1 ([facility] [address]     Diagnosed at 14years of age; has insulin pump    Fibromyalgia     Shoulder instability     cortizone shot for torn [facility] joint      Patient Active Problem List   Diagnosis    Anxiety     Chronic back pain    [facility], uncontrolled[facility] ([facility])    Tobacco abuse    Fibromyalgia    Schizoaffective disorder ([facility] [address] [facility]    Gastroesophageal reflux disease without esophagitis    [facility]     Past Surgical History:   Procedure Laterality Date     SECTION      LEEP      SHOULDER SURGERY Left     TUBAL LIGATION       Social History     Socioeconomic History    Marital status: Unknown     Spouse name: Not on file    Number of children: Not on file    Years of education: Not on file    Highest education level: Not on file   Occupational History    Not on file   Tobacco Use    Smoking status: Current Every Day Smoker     Packs/day: 1.00     Years: 12.00     Pack years: 12.00     Types: Cigarettes    Smokeless tobacco: Never Used   Vaping Use    Vaping Use: Never used   Substance and Sexual Activity    Alcohol use: No    Drug use: Yes     Types: Marijuana    Sexual activity: Yes     Partners: Male   Other Topics Concern    Not on file   Social History Narrative    Not on file     Social Determinants of Health     Financial Resource Strain:     Difficulty of Paying Living Expenses:    Food Insecurity:     Worried About Running Out of Food in the Last Year:     Ran Out of Food in the Last Year:    Transportation Needs:     Lack of Transportation (Medical):      Lack of Transportation (Non-Medical):    Physical Activity:     Days skin See Admin Instructions Follow package directions for low blood sugar. 1 kit 3     No current facility-administered medications for this visit. Allergies   Allergen Reactions    Ultram [Tramadol]      Family Status   Relation Name Status    Mother      Father  Alive       OBJECTIVE:  There were no vitals taken for this visit. Wt Readings from Last 3 Encounters:   20 149 lb (67.6 kg)   19 139 lb (63 kg)   19 134 lb (60.8 kg)     Weight 148     Constitutional: no acute distress, well appearing and well nourished  Psychiatric: oriented to person, place and time, judgement and insight and normal, recent and remote memory intact and mood and affect are normal  Skin: skin and subcutaneous tissue is normal without visible mass,   Head and Face: visual inspection  of head and face revealed no abnormalities  Eyes: visual inspection showed no lid or conjunctival swelling, erythema or discharge, pupils are normal, equal, round  Ears/Nose: external inspection of ears and nose revealed no abnormalities, hearing is grossly normal  Oropharynx/Mouth/Face: lips, tongue and gums appear  normal with no lesions, the voice quality was normal  Neck: neck appears symmetric, with no visible masses,   Pulmonary: no increased work of breathing or signs of respiratory distress,  Musculoskeletal: normal on inspection    Neurological: normal coordination and normal general cortical function        Lab Results   Component Value Date    LABA1C 8.9 2021    LABA1C 8.9 2020    LABA1C 9.2 2020         ASSESSMENT/PLAN:      1.  Type 1 diabetes mellitus with complication uncontrolled   Patient has not done her labs, when she had Covid infection in December she needed more insulin---  She will bring her insulin pump  for download  She denies any unexplained hypoglycemia  Advised to start taking meal boluses 10 to 15 minutes ahead of meals  Encouraged to do more accurate carbohydrate counting  She has some bolus related hypoglycemia she will bring her pump for download tomorrow  Adjusted her sensitivity to 80 as she was having post meal hypoglycemia    Health maintenance  -pt uptodate on eye exam no retinopathy 2020  - microalb/creat elevated July 2016 normal may 2017 , normal in September 2018   On  lisinopril 2,5 mg ---we discussed javi damage in detail with her   -foot exam performed revealed mild neuropathy may 2019 vibration reduced   She has tried Lyrica , cymbalta and currently is on 800 mg of Neurontin TID per PCP she asked for a refill as she is going to see a new primary care physician she was advised that I will only do one prescription with no refill   she was on metformin in the past but it was stopped --- Last DKA in dec 2015 with pump tubing issues . Eye exam march 2019 she is following with [facility]   --- Thyroid normal on palpation No discreet thyroid nodules identified on exam     Gabapentin 800 mg TID   All possible Side effects were discussed in detail with patient in detail and patient was advised to call our office if she  notes any side effects shewas given are written handout detailing side effects from the medication. Patient tells me she has been on this dose for years and has no side effects from this medication    Hyperlipidemia   ldl borderline 100 >84  She was advised to watch her diet and stick with low fat diet   tfts normal.    VIT D level was 26   Advised to increase supplement   She has been taking 5000 IUs daily she was advised to continue with increased dose she does note improvement in energy level     Migraine headaches    stable now    Chronic back pain    she was cautioned about anti-inflammatory medication and chronic kidney   She is taking antiinflammatory and it has improved     DEPRESSION/ANXIETY   Follows with psychiatry.         Reviewed and/or ordered clinical lab results Yes  Reviewed and/or ordered radiology tests Yes  Reviewed and/or ordered other diagnostic tests Yes  Made a decision to obtain old records Yes  Reviewed and summarized old records Yes      Joshua Florez was counseled regarding symptoms of current diagnosis, course and complications of disease if inadequately treated, side effects of medications, diagnosis, treatment options, and prognosis, risks, benefits, complications, and alternatives of treatment, labs, imaging and other studies and treatment targets and goals. She understands instructions and counseling. These diagnosis were discussed and reviewed with the patient including the advantages of drug therapy. She was counseled at this visit on the following: diabetes complication prevention and foot care      Return in about 3 months (around 9/7/2021). TELEHEALTH EVALUATION -- Audio/Visual (During IVFPV-41 public health emergency)  Pursuant to the emergency declaration under the [facility], [facility] waiver authority and the "[employer] and [employer] Act, this Virtual  Visit was conducted, with patient's consent, to reduce the patient's risk of exposure to COVID-19 and provide care for  patient. Services were provided through a video synchronous discussion virtually to substitute for in-person clinic visit. Patient's location : home     Patient provided verbal consent to use the video visit. Total time spent : 20+ reviewing the chart, conducting an interview, performing a limited exam by video and educating the patient on my assessment plan.

## 2021-06-30 ENCOUNTER — TELEPHONE (OUTPATIENT)
Dept: ENDOCRINOLOGY | Age: 36
End: 2021-06-30

## 2021-06-30 NOTE — TELEPHONE ENCOUNTER
Fax from [address] [place] [address] regarding a physicians order to be signed and faxed back

## 2021-09-13 ENCOUNTER — TELEPHONE (OUTPATIENT)
Dept: ENDOCRINOLOGY | Age: 36
End: 2021-09-13

## 2021-09-16 ENCOUNTER — VIRTUAL VISIT (OUTPATIENT)
Dept: ENDOCRINOLOGY | Age: 36
End: 2021-09-16
Payer: COMMERCIAL

## 2021-09-16 DIAGNOSIS — K21.9 GASTROESOPHAGEAL REFLUX DISEASE WITHOUT ESOPHAGITIS: Chronic | ICD-10-CM

## 2021-09-16 DIAGNOSIS — F25.0 SCHIZOAFFECTIVE DISORDER, BIPOLAR TYPE (HCC): Chronic | ICD-10-CM

## 2021-09-16 DIAGNOSIS — M79.7 FIBROMYALGIA: Chronic | ICD-10-CM

## 2021-09-16 PROCEDURE — 99214 OFFICE O/P EST MOD 30 MIN: CPT | Performed by: INTERNAL MEDICINE

## 2021-09-16 RX ORDER — ALPRAZOLAM 1 MG/1
1 TABLET ORAL 4 TIMES DAILY
COMMUNITY

## 2021-09-16 ASSESSMENT — ENCOUNTER SYMPTOMS: VISUAL CHANGE: 0

## 2021-09-16 NOTE — Clinical Note
Please schedule for a follow up in 3 months and mail patient  lab orders   Patient contacted [employer] before but has not heard back from them she is probably due for an upgrade to a 770 G pump.

## 2021-09-16 NOTE — PROGRESS NOTES
Stew Whitfield is a 39 y.o. female here for uncontrolled type 1 Diabetes . Pt was diagnosed with diabetes when she was 14years of age she had a viral infection on a vacation and was sick for almost 2 wks and was later on found to have diabetes . Pt got diabetic eduaction when she was diagnosed initialy and then didn't get any refresher . Pt was on NPH 35 and 10 of R and was switched to 40 units of NPH with 15 of R . Pt has 3 kids one set of twin , she didn't had any complications during her pregnancy and as per pt she had a good control of diabetes during her pregnancies Pt denies any change in her skin texture , denies any change in her bowel habits , denies any heat or cold intolerance , denies any recent weight change ,denies any palpitation . Pt denies any chest pain or SOB . Pt denies any numbness and tingling in her extremities    Diabetes History   Dx: DM Type I   Glucose Monitoring:   Glucose Monitoring: Yes   Glucose Testing Frequency: fasting, Test Site: fingerstick   Home Monitoring Reviewed: Verbal recall   Breakfast: Before: 230 she is not checking any other time of the day   Hypoglycemic Events:   Frequency: none   Hypoglycemia Symptoms: hunger, shaking, unawareness   Dietary Management:   Diet: no restrictions, carb counting   Exercise: Active Lifestyle   Insulin Management:   Site Rotation: yes   Injection/Injection Sites: upper arm, abdomen   Diabetic Eye Exam:   Date of last exam: jan 2014   Physician: eddy   Discussed   Comments: no retinopathy   Associated Symptoms:   Denies: anxiety, claudication, depression, non healing wounds, numbness, orthostasis, polydipsia, polyuria, sexual dysfunction, tingling, vision problems       Comorbid conditions: Neuropathy and Nephropathy  Current diabetic medications include: insulin via pump     INTERIM:    Diabetes  She presents for her follow-up diabetic visit. She has type 1 diabetes mellitus. MedicAlert identification noted.  The initial diagnosis of diabetes was made 22 years ago. Her disease course has been stable. Hypoglycemia symptoms include confusion, dizziness, nervousness/anxiousness and sweats. Associated symptoms include polyuria. Pertinent negatives for diabetes include no visual change, no weakness and no weight loss. There are no hypoglycemic complications. Pertinent negatives for hypoglycemia complications include no hospitalization, no nocturnal hypoglycemia, no required assistance and no required glucagon injection. Symptoms are stable. Diabetic complications include peripheral neuropathy. Risk factors for coronary artery disease include dyslipidemia and diabetes mellitus. Current diabetic treatment includes insulin pump. She is compliant with treatment most of the time. Her weight is stable. She is following a diabetic diet. Meal planning includes avoidance of concentrated sweets. She has not had a previous visit with a dietitian. She rarely participates in exercise. There is no change in her home blood glucose trend. Her breakfast blood glucose is taken between 7-8 am. Her breakfast blood glucose range is generally 110-130 mg/dl. Her lunch blood glucose is taken between 12-1 pm. Her lunch blood glucose range is generally 110-130 mg/dl. Her dinner blood glucose is taken between 7-8 pm. Her dinner blood glucose range is generally 140-180 mg/dl. An ACE inhibitor/angiotensin II receptor blocker is being taken. She does not see a podiatrist.Eye exam is current. Other  Pertinent negatives include no visual change or weakness. But now her glucose are better  She is not having  lows   Hurt her Rt toe and was infected, her primary care physician put her on antibiotics and she tells me it has healed well  Doing better lately. Weight trend: stable  Prior visit with dietician: yes  Current diet: on average, 3 meals per day  Current exercise: rare    Has there been any hospitalization, surgery or major illness since the last visit?  No   Has there been any new school, family or social problems since the last visit? No  Has there been any new family history of members with diabetes, heart disease, stroke, or endocrine related problems since the last visit? No    Past Medical History:   Diagnosis Date    Anemia     Anxiety     Back pain     Diabetes mellitus type 1 ([facility] [address]     Diagnosed at 14years of age; has insulin pump    Fibromyalgia     Shoulder instability     cortizone shot for torn [facility] joint      Patient Active Problem List   Diagnosis    Anxiety     Chronic back pain    Type 1 diabetes, uncontrolled, with neuropathy ([facility])    Tobacco abuse    Fibromyalgia    Schizoaffective disorder ([facility] [address]    Gastroesophageal reflux disease without esophagitis    [facility]     Past Surgical History:   Procedure Laterality Date     SECTION      LEEP      SHOULDER SURGERY Left     TUBAL LIGATION       Social History     Socioeconomic History    Marital status: Unknown     Spouse name: Not on file    Number of children: Not on file    Years of education: Not on file    Highest education level: Not on file   Occupational History    Not on file   Tobacco Use    Smoking status: Current Every Day Smoker     Packs/day: 1.00     Years: 12.00     Pack years: 12.00     Types: Cigarettes    Smokeless tobacco: Never Used   Vaping Use    Vaping Use: Never used   Substance and Sexual Activity    Alcohol use: No    Drug use: Yes     Types: Marijuana    Sexual activity: Yes     Partners: Male   Other Topics Concern    Not on file   Social History Narrative    Not on file     Social Determinants of Health     Financial Resource Strain:     Difficulty of Paying Living Expenses:    Food Insecurity:     Worried About Running Out of Food in the Last Year:     Ran Out of Food in the Last Year:    Transportation Needs:     Lack of Transportation (Medical):      Lack of Transportation (Non-Medical):    Physical Activity:     Days of Exercise per Week:     Minutes of Exercise per Session:    Stress:     Feeling of Stress :    Social Connections:     Frequency of Communication with Friends and Family:     Frequency of Social Gatherings with Friends and Family:     Attends Muslim Services:     Active Member of Clubs or Organizations:     Attends Club or Organization Meetings:     Marital Status:    Intimate Partner Violence:     Fear of Current or Ex-Partner:     Emotionally Abused:     Physically Abused:     Sexually Abused:      Family History   Problem Relation Age of Onset    High Blood Pressure Mother     Cancer Mother     Diabetes Mother     Depression Mother      Current Outpatient Medications   Medication Sig Dispense Refill    ALPRAZolam (XANAX) 1 MG tablet Take 1 mg by mouth 4 times daily.  insulin lispro (HUMALOG) 100 UNIT/ML injection vial INJECT 100 UNITS PER PUMP EVERY DAY AS DIRECTED 20 mL 4    Lancets (ONETOUCH DELICA PLUS OGFOHW14O) MISC USE TO TEST FIVE TIMES DAILY 100 each 5    Blood Glucose Monitoring Suppl (ONE TOUCH ULTRA 2) w/Device KIT USE AS DIRECTED TO TEST BLOOD SUGAR 1 kit 0    blood glucose test strips (CONTOUR TEST) strip Test 5 times daily.  200 each 5    ferrous sulfate 325 (65 Fe) MG tablet TAKE 1 TABLET BY MOUTH EVERY DAY      guanFACINE (INTUNIV) 2 MG TB24 extended release tablet TK 1 T PO QD      ibuprofen (ADVIL;MOTRIN) 800 MG tablet TAKE 1 TABLET BY MOUTH THREE TIMES A DAY      omeprazole (PRILOSEC) 20 MG delayed release capsule TAKE 1 CAPSULE BY MOUTH DAILY 30 capsule 3    gabapentin (NEURONTIN) 800 MG tablet TAKE 1 TABLET BY MOUTH THREE TIMES DAILY (Patient taking differently: Take 800 mg by mouth 4 times daily. ) 90 tablet 3    INS SYRINGE/NEEDLE .5CC/27G 27G X 1/2\" 0.5 ML MISC To be used in cse of emergency when insulin pump doesn't work 10 each 0    hydrOXYzine (VISTARIL) 50 MG capsule Take 50 mg by mouth 3 times daily as needed       glucagon 1 MG injection Inject 1 mg into the skin See Admin Instructions Follow package directions for low blood sugar. 1 kit 3    gabapentin (NEURONTIN) 800 MG tablet TAKE 1 TABLET BY MOUTH THREE TIMES A DAY (Patient not taking: Reported on 2021)       No current facility-administered medications for this visit. Allergies   Allergen Reactions    Ultram [Tramadol]      Family Status   Relation Name Status    Mother      Father  Alive       OBJECTIVE:  There were no vitals taken for this visit. Wt Readings from Last 3 Encounters:   20 149 lb (67.6 kg)   19 139 lb (63 kg)   19 134 lb (60.8 kg)     Weight 148       Patient is  alert oriented to time ,place and person. Both recent and remote memory intact . Patient has weighed themselves in the last few  weeks and it has been stable     Lab Results   Component Value Date    LABA1C 8.9 2021    LABA1C 8.9 2020    LABA1C 9.2 2020         ASSESSMENT/PLAN:      1.  Type 1 diabetes mellitus with complication uncontrolled   Patient has not done her labs, when she had Covid infection in December she needed more insulin---  She will bring her insulin pump  for download  She denies any unexplained hypoglycemia  Advised to start taking meal boluses 10 to 15 minutes ahead of meals  Encouraged to do more accurate carbohydrate counting  She has some bolus related hypoglycemia she will bring her pump for download tomorrow  Adjusted her sensitivity to 80 as she was having post meal hypoglycemia    Health maintenance  -pt uptodate on eye exam no retinopathy   - microalb/creat elevated 2016 normal may 2017 , normal in 2018   On  lisinopril 2,5 mg ---we discussed kideny damage in detail with her   -foot exam performed revealed mild neuropathy may 2019 vibration reduced   She has tried Lyrica , cymbalta and currently is on 800 mg of Neurontin TID per PCP she asked for a refill as she is going to see a new primary care physician she was advised that I will only do one prescription with no refill   she was on metformin in the past but it was stopped --- Last DKA in dec 2015 with pump tubing issues . Eye exam march 2019 she is following with [facility]   --- Thyroid normal on palpation No discreet thyroid nodules identified on exam     Gabapentin 800 mg TID   All possible Side effects were discussed in detail with patient in detail and patient was advised to call our office if she  notes any side effects shewas given are written handout detailing side effects from the medication. Patient tells me she has been on this dose for years and has no side effects from this medication    Hyperlipidemia   ldl borderline 100 >84  She was advised to watch her diet and stick with low fat diet   tfts normal.    VIT D level was 26   Advised to increase supplement   She has been taking 5000 IUs daily she was advised to continue with increased dose she does note improvement in energy level     Migraine headaches    stable now    Chronic back pain    she was cautioned about anti-inflammatory medication and chronic kidney   She is taking antiinflammatory and it has improved     DEPRESSION/ANXIETY   Follows with psychiatry. Reviewed and/or ordered clinical lab results Yes  Reviewed and/or ordered radiology tests Yes  Reviewed and/or ordered other diagnostic tests Yes  Made a decision to obtain old records Yes  Reviewed and summarized old records Yes      Justice Mares was counseled regarding symptoms of current diagnosis, course and complications of disease if inadequately treated, side effects of medications, diagnosis, treatment options, and prognosis, risks, benefits, complications, and alternatives of treatment, labs, imaging and other studies and treatment targets and goals. She understands instructions and counseling. These diagnosis were discussed and reviewed with the patient including the advantages of drug therapy.  She was counseled at this visit on the following: diabetes complication prevention and foot care      Return in about 3 months (around 12/16/2021). TELEHEALTH EVALUATION -- Audio/Visual (During DTWFP-80 public health emergency)  Pursuant to the emergency declaration under the [address] [place] [address] waiver authority and the [employer] and [employer] Act, this Virtual  Visit was conducted, with patient's consent, to reduce the patient's risk of exposure to COVID-19 and provide care for  patient. Services were provided through a audio  synchronous discussion virtually to substitute for in-person clinic visit. Patient's location : home     Patient provided verbal consent to use the audio visit. Total time spent : 20+ reviewing the chart, conducting an interview, performing a limited exam by audio  and educating the patient on my assessment plan.

## 2021-11-30 ENCOUNTER — TELEPHONE (OUTPATIENT)
Dept: ENDOCRINOLOGY | Age: 36
End: 2021-11-30

## 2021-11-30 NOTE — TELEPHONE ENCOUNTER
Scanned in media.  Was waiting for a call back from pt to confirm whether these FMLA  papers were hers because they did not have a  or address or phone #

## 2021-12-07 ENCOUNTER — OFFICE VISIT (OUTPATIENT)
Dept: ENDOCRINOLOGY | Age: 36
End: 2021-12-07
Payer: COMMERCIAL

## 2021-12-07 VITALS
HEART RATE: 78 BPM | DIASTOLIC BLOOD PRESSURE: 71 MMHG | WEIGHT: 150.4 LBS | SYSTOLIC BLOOD PRESSURE: 111 MMHG | BODY MASS INDEX: 26.64 KG/M2

## 2021-12-07 DIAGNOSIS — M79.7 FIBROMYALGIA: Chronic | ICD-10-CM

## 2021-12-07 DIAGNOSIS — F25.0 SCHIZOAFFECTIVE DISORDER, BIPOLAR TYPE (HCC): ICD-10-CM

## 2021-12-07 LAB — HBA1C MFR BLD: 8.3 %

## 2021-12-07 PROCEDURE — 83036 HEMOGLOBIN GLYCOSYLATED A1C: CPT | Performed by: INTERNAL MEDICINE

## 2021-12-07 PROCEDURE — 3052F HG A1C>EQUAL 8.0%<EQUAL 9.0%: CPT | Performed by: INTERNAL MEDICINE

## 2021-12-07 PROCEDURE — 99214 OFFICE O/P EST MOD 30 MIN: CPT | Performed by: INTERNAL MEDICINE

## 2021-12-07 PROCEDURE — G8427 DOCREV CUR MEDS BY ELIG CLIN: HCPCS | Performed by: INTERNAL MEDICINE

## 2021-12-07 PROCEDURE — 95251 CONT GLUC MNTR ANALYSIS I&R: CPT | Performed by: INTERNAL MEDICINE

## 2021-12-07 PROCEDURE — 2022F DILAT RTA XM EVC RTNOPTHY: CPT | Performed by: INTERNAL MEDICINE

## 2021-12-07 PROCEDURE — 4004F PT TOBACCO SCREEN RCVD TLK: CPT | Performed by: INTERNAL MEDICINE

## 2021-12-07 PROCEDURE — G8484 FLU IMMUNIZE NO ADMIN: HCPCS | Performed by: INTERNAL MEDICINE

## 2021-12-07 PROCEDURE — G8419 CALC BMI OUT NRM PARAM NOF/U: HCPCS | Performed by: INTERNAL MEDICINE

## 2021-12-07 ASSESSMENT — ENCOUNTER SYMPTOMS: VISUAL CHANGE: 0

## 2021-12-07 NOTE — PROGRESS NOTES
Sathish Montaño is a 39 y.o. female here for uncontrolled type 1 Diabetes . Pt was diagnosed with diabetes when she was 14years of age she had a viral infection on a vacation and was sick for almost 2 wks and was later on found to have diabetes . Pt got diabetic eduaction when she was diagnosed initialy and then didn't get any refresher . Pt was on NPH 35 and 10 of R and was switched to 40 units of NPH with 15 of R . Pt has 3 kids one set of twin , she didn't had any complications during her pregnancy and as per pt she had a good control of diabetes during her pregnancies Pt denies any change in her skin texture , denies any change in her bowel habits , denies any heat or cold intolerance , denies any recent weight change ,denies any palpitation . Pt denies any chest pain or SOB . Pt denies any numbness and tingling in her extremities    Diabetes History   Dx: DM Type I   Glucose Monitoring:   Glucose Monitoring: Yes   Glucose Testing Frequency: fasting, Test Site: fingerstick   Home Monitoring Reviewed: Verbal recall   Breakfast: Before: 230 she is not checking any other time of the day   Hypoglycemic Events:   Frequency: none   Hypoglycemia Symptoms: hunger, shaking, unawareness   Dietary Management:   Diet: no restrictions, carb counting   Exercise: Active Lifestyle   Insulin Management:   Site Rotation: yes   Injection/Injection Sites: upper arm, abdomen   Diabetic Eye Exam:   Date of last exam: jan 2014   Physician: eddy   Discussed   Comments: no retinopathy   Associated Symptoms:   Denies: anxiety, claudication, depression, non healing wounds, numbness, orthostasis, polydipsia, polyuria, sexual dysfunction, tingling, vision problems       Comorbid conditions: Neuropathy and Nephropathy  Current diabetic medications include: insulin via pump     INTERIM:    Diabetes  She presents for her follow-up diabetic visit. She has type 1 diabetes mellitus. MedicAlert identification noted.  The initial diagnosis of diabetes was made 22 years ago. Her disease course has been stable. Hypoglycemia symptoms include confusion, dizziness, nervousness/anxiousness and sweats. Associated symptoms include polyuria. Pertinent negatives for diabetes include no visual change, no weakness and no weight loss. There are no hypoglycemic complications. Pertinent negatives for hypoglycemia complications include no hospitalization, no nocturnal hypoglycemia, no required assistance and no required glucagon injection. Symptoms are stable. Diabetic complications include peripheral neuropathy. Risk factors for coronary artery disease include dyslipidemia and diabetes mellitus. Current diabetic treatment includes insulin pump. She is compliant with treatment most of the time. Her weight is stable. She is following a diabetic diet. Meal planning includes avoidance of concentrated sweets. She has not had a previous visit with a dietitian. She rarely participates in exercise. There is no change in her home blood glucose trend. Her breakfast blood glucose is taken between 7-8 am. Her breakfast blood glucose range is generally 110-130 mg/dl. Her lunch blood glucose is taken between 12-1 pm. Her lunch blood glucose range is generally 110-130 mg/dl. Her dinner blood glucose is taken between 7-8 pm. Her dinner blood glucose range is generally 140-180 mg/dl. An ACE inhibitor/angiotensin II receptor blocker is being taken. She does not see a podiatrist.Eye exam is current. Other  Pertinent negatives include no visual change or weakness. She denies any hypoglycemia   Has been  adjusts basal rates occasionally. Weight trend: stable  Prior visit with dietician: yes  Current diet: on average, 3 meals per day  Current exercise: rare    Has there been any hospitalization, surgery or major illness since the last visit? No   Has there been any new school, family or social problems since the last visit?   No  Has there been any new family history of members with diabetes, heart disease, stroke, or endocrine related problems since the last visit? No    Past Medical History:   Diagnosis Date    Anemia     Anxiety     Back pain     Diabetes mellitus type 1 ([address]     Diagnosed at 14years of age; has insulin pump    Fibromyalgia     Shoulder instability     cortizone shot for brock [facility] joint      Patient Active Problem List   Diagnosis    Anxiety     Chronic back pain    [facility] diabetes, uncontrolled, [facility])    Tobacco abuse    Fibromyalgia    Schizoaffective disorder ([address]    Gastroesophageal reflux disease without esophagitis    Drug abuse and dependence ([facility]     Past Surgical History:   Procedure Laterality Date     SECTION      LEEP      SHOULDER SURGERY Left     TUBAL LIGATION       Social History     Socioeconomic History    Marital status:      Spouse name: Not on file    Number of children: Not on file    Years of education: Not on file    Highest education level: Not on file   Occupational History    Not on file   Tobacco Use    Smoking status: Current Every Day Smoker     Packs/day: 1.00     Years: 12.00     Pack years: 12.00     Types: Cigarettes    Smokeless tobacco: Never Used   Vaping Use    Vaping Use: Never used   Substance and Sexual Activity    Alcohol use: No    Drug use: Yes     Types: Marijuana [place])    Sexual activity: Yes     Partners: Male   Other Topics Concern    Not on file   Social History Narrative    Not on file     Social Determinants of Health     Financial Resource Strain:     Difficulty of Paying Living Expenses: Not on file   Food Insecurity:     Worried About Running Out of Food in the Last Year: Not on file    Dorian of Food in the Last Year: Not on file   Transportation Needs:     Lack of Transportation (Medical): Not on file    Lack of Transportation (Non-Medical):  Not on file   Physical Activity:     Days of Exercise per Week: Not on file    Minutes of Exercise per Session: Not on file   Stress:     Feeling of Stress : Not on file   Social Connections:     Frequency of Communication with Friends and Family: Not on file    Frequency of Social Gatherings with Friends and Family: Not on file    Attends Druze Services: Not on file    Active Member of Clubs or Organizations: Not on file    Attends Club or Organization Meetings: Not on file    Marital Status: Not on file   Intimate Partner Violence:     Fear of Current or Ex-Partner: Not on file    Emotionally Abused: Not on file    Physically Abused: Not on file    Sexually Abused: Not on file   Housing Stability:     Unable to Pay for Housing in the Last Year: Not on file    Number of [place] in the Last Year: Not on file    Unstable Housing in the Last Year: Not on file     Family History   Problem Relation Age of Onset    High Blood Pressure Mother     Cancer Mother     Diabetes Mother     Depression Mother      Current Outpatient Medications   Medication Sig Dispense Refill    ALPRAZolam (XANAX) 1 MG tablet Take 1 mg by mouth 4 times daily.  insulin lispro (HUMALOG) 100 UNIT/ML injection vial INJECT 100 UNITS PER PUMP EVERY DAY AS DIRECTED 20 mL 4    Lancets (ONETOUCH DELICA PLUS [id number]) MISC USE TO TEST FIVE TIMES DAILY 100 each 5    Blood Glucose Monitoring Suppl (ONE TOUCH ULTRA 2) w/Device KIT USE AS DIRECTED TO TEST BLOOD SUGAR 1 kit 0    blood glucose test strips (CONTOUR TEST) strip Test 5 times daily.  200 each 5    ferrous sulfate 325 (65 Fe) MG tablet TAKE 1 TABLET BY MOUTH EVERY DAY      guanFACINE (INTUNIV) 2 MG TB24 extended release tablet TK 1 T PO QD      ibuprofen (ADVIL;MOTRIN) 800 MG tablet TAKE 1 TABLET BY MOUTH THREE TIMES A DAY      omeprazole (PRILOSEC) 20 MG delayed release capsule TAKE 1 CAPSULE BY MOUTH DAILY 30 capsule 3    gabapentin (NEURONTIN) 800 MG tablet TAKE 1 TABLET BY MOUTH THREE TIMES DAILY (Patient taking differently: Take 800 mg by mouth 4 times daily. ) 90 tablet 3    INS SYRINGE/NEEDLE .5CC/27G 27G X 1/2\" 0.5 ML MISC To be used in cse of emergency when insulin pump doesn't work 10 each 0    glucagon 1 MG injection Inject 1 mg into the skin See Admin Instructions Follow package directions for low blood sugar. 1 kit 3    gabapentin (NEURONTIN) 800 MG tablet TAKE 1 TABLET BY MOUTH THREE TIMES A DAY (Patient not taking: Reported on 2021)      hydrOXYzine (VISTARIL) 50 MG capsule Take 50 mg by mouth 3 times daily as needed  (Patient not taking: Reported on 2021)       No current facility-administered medications for this visit. Allergies   Allergen Reactions    Ultram [Tramadol]      Family Status   Relation Name Status    Mother      Father  Alive     ROS  I have reviewed the review of system questionnaire filled by the patient .   Patient was advised to contact PCP for non endocrine signs and symptoms     OBJECTIVE:  /71   Pulse 78   Wt 150 lb 6.4 oz (68.2 kg)   BMI 26.64 kg/m²    Wt Readings from Last 3 Encounters:   21 150 lb 6.4 oz (68.2 kg)   20 149 lb (67.6 kg)   19 139 lb (63 kg)     Weight 148     Constitutional: no acute distress, well appearing, well nourished  Psychiatric: oriented to person, place and time, judgement, insight and normal, recent and remote memory and intact and mood, affect are normal  Skin: skin and subcutaneous tissue is normal without mass,   Head and Face: examination of head and face revealed no abnormalities  Eyes: no lid or conjunctival swelling, no erythema or discharge, pupils are normal,   Ears/Nose: external inspection of ears and nose revealed no abnormalities, hearing is grossly normal  Oropharynx/Mouth/Face: lips, tongue and gums are normal with no lesions, the voice quality was normal  Neck: neck is supple and symmetric, with midline trachea and no masses, thyroid is normal    Pulmonary: no increased work of breathing or signs of respiratory distress, lungs are clear to auscultation  Cardiovascular: normal heart rate and rhythm, normal S1 and S2,   Musculoskeletal: normal gait and station,   Neurological: normal coordination, normal general cortical function      Lab Results   Component Value Date    LABA1C 8.3 12/07/2021    LABA1C 8.9 02/02/2021    LABA1C 8.9 06/26/2020         ASSESSMENT/PLAN:      1. Type 1 diabetes mellitus with complication uncontrolled A1c 8.3    Diabetes control is inadequate she was advised to take her meal boluses 10 minutes prior to eating. She denies any unexplained hypoglycemia  Advised to start taking meal boluses 10 to 15 minutes ahead of meals  Change CIR to 13:1 as she has significant postprandial hyperglycemia  Encouraged to do more accurate carbohydrate counting  She has some bolus related hypoglycemia she will bring her pump for download tomorrow  Adjusted her sensitivity to 80 as she was having post meal hypoglycemia    Health maintenance  -pt uptodate on eye exam no retinopathy 2020  - microalb/creat elevated July 2016 normal may 2017 , normal in September 2018   On  lisinopril 2,5 mg ---we discussed kideny damage in detail with her   -foot exam performed revealed mild neuropathy may 2019 vibration reduced   She has tried Lyrica , cymbalta and currently is on 800 mg of Neurontin TID per PCP      she was on metformin in the past but it was stopped --- Last DKA in dec 2015 with pump tubing issues . Eye exam march 2019 she is [facility] with [facility]   --- Thyroid normal on palpation No discreet thyroid nodules identified on exam     Gabapentin 800 mg TID   All possible Side effects were discussed in detail with patient in detail and patient was advised to call our office if she  notes any side effects shewas given are written handout detailing side effects from the medication.     Patient tells me she has been on this dose for years and has no side effects from this medication    Hyperlipidemia   ldl borderline 100 >84  She was advised to watch her diet and stick with low fat diet   tfts normal.    VIT D level was 26   Advised to increase supplement   She has been taking 5000 IUs daily she was advised to continue with increased dose she does note improvement in energy level     Migraine headaches    stable now    Chronic back pain    she was cautioned about anti-inflammatory medication and chronic kidney   She is taking antiinflammatory and it has improved     DEPRESSION/ANXIETY   Follows with psychiatry. Reviewed and/or ordered clinical lab results Yes  Reviewed and/or ordered radiology tests Yes  Reviewed and/or ordered other diagnostic tests Yes  Made a decision to obtain old records Yes  Reviewed and summarized old records Yes      Katlyn Dowell was counseled regarding symptoms of current diagnosis, course and complications of disease if inadequately treated, side effects of medications, diagnosis, treatment options, and prognosis, risks, benefits, complications, and alternatives of treatment, labs, imaging and other studies and treatment targets and goals. She understands instructions and counseling. These diagnosis were discussed and reviewed with the patient including the advantages of drug therapy. She was counseled at this visit on the following: diabetes complication prevention and foot care      Return in about 3 months (around 3/7/2022). Diabetes Continuous Glucose Monitoring Report         Reason for Study:     - improve diabetic control without risk of hypoglycemia       Current Medication regimen:      630 insulin pump   CGMS Report     CGMS data collection was performed on Dec 7, 2021   Patient provided information on her  diet, activities and insulin dosing  during this period.    Data was available for 14   days     Sensor Data Report:   - 12 AM to 6 AM: Overnight blood glucose pattern shows elevated glucose   - 6   AM to 10 AM:  Post breakfast hyperglycemia  is noted  --10AM to 5 PM :  No hypoglycemia observed during this time. - 5   PM to 8 PM: Post meal hyperglycemia  is noted.        Average reading  199 mg/dL     Standard Dev  73  mg/dL   % of time <70 mg/dL  2 %   % of time >180  Mg/dL 60  %   % of time within range 38 %  Number of hypoglycemia episodes noted: 0     Impression:   CGMS shows elevated glucose      Recommendation:      Patient was advised to make the following changes in her diabetic regimen  Change CIR with meals to 13

## 2022-01-28 ENCOUNTER — TELEPHONE (OUTPATIENT)
Dept: ENDOCRINOLOGY | Age: 37
End: 2022-01-28

## 2022-02-07 NOTE — TELEPHONE ENCOUNTER
Fax from [phone] [facility] [place] this time w/ a CGM w/ Physicians Order form to complete

## 2022-02-17 ENCOUNTER — TELEPHONE (OUTPATIENT)
Dept: ENDOCRINOLOGY | Age: 37
End: 2022-02-17

## 2022-02-17 NOTE — TELEPHONE ENCOUNTER
Fax came from [phone] [facility] [place] w/ the [facility] sensors & transmitter denial attached w/ it    Insurance is requesting info please provide?

## 2022-02-23 ENCOUNTER — TELEPHONE (OUTPATIENT)
Dept: ENDOCRINOLOGY | Age: 37
End: 2022-02-23

## 2022-02-23 RX ORDER — CARVEDILOL 25 MG/1
TABLET, FILM COATED ORAL
Qty: 200 EACH | Refills: 5 | Status: SHIPPED | OUTPATIENT
Start: 2022-02-23 | End: 2022-02-24

## 2022-02-23 RX ORDER — BLOOD-GLUCOSE METER
KIT MISCELLANEOUS
Qty: 200 STRIP | Refills: 5 | Status: SHIPPED | OUTPATIENT
Start: 2022-02-23 | End: 2022-02-24

## 2022-02-24 RX ORDER — BLOOD SUGAR DIAGNOSTIC
STRIP MISCELLANEOUS
Qty: 200 EACH | Refills: 5 | Status: SHIPPED | OUTPATIENT
Start: 2022-02-24

## 2022-02-24 RX ORDER — LANCING DEVICE/LANCETS
KIT MISCELLANEOUS
Qty: 1 EACH | Refills: 0 | Status: SHIPPED | OUTPATIENT
Start: 2022-02-24

## 2022-02-24 RX ORDER — BLOOD-GLUCOSE METER
EACH MISCELLANEOUS
Qty: 1 KIT | Refills: 0 | Status: SHIPPED | OUTPATIENT
Start: 2022-02-24

## 2022-02-24 RX ORDER — LANCETS 33 GAUGE
EACH MISCELLANEOUS
Qty: 200 EACH | Refills: 5 | Status: SHIPPED | OUTPATIENT
Start: 2022-02-24

## 2022-03-01 RX ORDER — BLOOD-GLUCOSE METER
EACH MISCELLANEOUS
Qty: 1 KIT | Refills: 0 | Status: SHIPPED | OUTPATIENT
Start: 2022-03-01

## 2022-03-01 NOTE — TELEPHONE ENCOUNTER
Fax from [facility]    Alternative requested - One Touch Verio Flex is what is available.  Rx YADY 1 for plain which is not available

## 2022-04-12 ENCOUNTER — TELEMEDICINE (OUTPATIENT)
Dept: ENDOCRINOLOGY | Age: 37
End: 2022-04-12
Payer: COMMERCIAL

## 2022-04-12 DIAGNOSIS — F25.0 SCHIZOAFFECTIVE DISORDER, BIPOLAR TYPE (HCC): ICD-10-CM

## 2022-04-12 PROCEDURE — 99442 PR PHYS/QHP TELEPHONE EVALUATION 11-20 MIN: CPT | Performed by: INTERNAL MEDICINE

## 2022-04-12 ASSESSMENT — ENCOUNTER SYMPTOMS: VISUAL CHANGE: 0

## 2022-04-12 NOTE — PROGRESS NOTES
Delmi Donovan is a 39 y.o. female here for uncontrolled type 1 Diabetes . Pt was diagnosed with diabetes when she was 14years of age she had a viral infection on a vacation and was sick for almost 2 wks and was later on found to have diabetes . Pt got diabetic eduaction when she was diagnosed initialy and then didn't get any refresher . Pt was on NPH 35 and 10 of R and was switched to 40 units of NPH with 15 of R . Pt has 3 kids one set of twin , she didn't had any complications during her pregnancy and as per pt she had a good control of diabetes during her pregnancies Pt denies any change in her skin texture , denies any change in her bowel habits , denies any heat or cold intolerance , denies any recent weight change ,denies any palpitation . Pt denies any chest pain or SOB . Pt denies any numbness and tingling in her extremities    Diabetes History   Dx: DM Type I   Glucose Monitoring:   Glucose Monitoring: Yes   Glucose Testing Frequency: fasting, Test Site: fingerstick   Home Monitoring Reviewed: Verbal recall   Breakfast: Before: 230 she is not checking any other time of the day   Hypoglycemic Events:   Frequency: none   Hypoglycemia Symptoms: hunger, shaking, unawareness   Dietary Management:   Diet: no restrictions, carb counting   Exercise: Active Lifestyle   Insulin Management:   Site Rotation: yes   Injection/Injection Sites: upper arm, abdomen   Diabetic Eye Exam:   Date of last exam: jan 2014   Physician: eddy   Discussed   Comments: no retinopathy   Associated Symptoms:   Denies: anxiety, claudication, depression, non healing wounds, numbness, orthostasis, polydipsia, polyuria, sexual dysfunction, tingling, vision problems       Comorbid conditions: Neuropathy and Nephropathy  Current diabetic medications include: insulin via pump     INTERIM:    Diabetes  She presents for her follow-up diabetic visit. She has type 1 diabetes mellitus. MedicAlert identification noted.  The initial diagnosis of diabetes was made 22 years ago. Her disease course has been stable. Hypoglycemia symptoms include confusion, dizziness, nervousness/anxiousness and sweats. Associated symptoms include polyuria. Pertinent negatives for diabetes include no visual change, no weakness and no weight loss. There are no hypoglycemic complications. Pertinent negatives for hypoglycemia complications include no hospitalization, no nocturnal hypoglycemia, no required assistance and no required glucagon injection. Symptoms are stable. Diabetic complications include peripheral neuropathy. Risk factors for coronary artery disease include dyslipidemia and diabetes mellitus. Current diabetic treatment includes insulin pump. She is compliant with treatment most of the time. Her weight is stable. She is following a diabetic diet. Meal planning includes avoidance of concentrated sweets. She has not had a previous visit with a dietitian. She rarely participates in exercise. There is no change in her home blood glucose trend. Her breakfast blood glucose is taken between 7-8 am. Her breakfast blood glucose range is generally 110-130 mg/dl. Her lunch blood glucose is taken between 12-1 pm. Her lunch blood glucose range is generally 110-130 mg/dl. Her dinner blood glucose is taken between 7-8 pm. Her dinner blood glucose range is generally 140-180 mg/dl. An ACE inhibitor/angiotensin II receptor blocker is being taken. She does not see a podiatrist.Eye exam is current. Other  Pertinent negatives include no visual change or weakness. She denies any hypoglycemia   He has started new job and feels better       Weight trend: stable  Prior visit with dietician: yes  Current diet: on average, 3 meals per day  Current exercise: rare    Has there been any hospitalization, surgery or major illness since the last visit? No   Has there been any new school, family or social problems since the last visit?   No  Has there been any new family history of members with diabetes, heart disease, stroke, or endocrine related problems since the last visit? No    Past Medical History:   Diagnosis Date    Anemia     Anxiety     Back pain     Diabetes mellitus type 1 ([facility]     Diagnosed at 14years of age; has insulin pump    Fibromyalgia     Shoulder instability     cortizone shot for brock BENÍTEZPAULINO [facility] [place] joint      Patient Active Problem List   Diagnosis    Anxiety     Chronic back pain    Type 1 diabetes, uncontrolled, with neuropathy (HCC)    Tobacco abuse    Fibromyalgia    Schizoaffective disorder ([facility]    Gastroesophageal reflux disease without esophagitis    Drug abuse and dependence ([facility]     Past Surgical History:   Procedure Laterality Date     SECTION      LEEP      SHOULDER SURGERY Left     TUBAL LIGATION       Social History     Socioeconomic History    Marital status:      Spouse name: Not on file    Number of children: Not on file    Years of education: Not on file    Highest education level: Not on file   Occupational History    Not on file   Tobacco Use    Smoking status: Current Every Day Smoker     Packs/day: 1.00     Years: 12.00     Pack years: 12.00     Types: Cigarettes    Smokeless tobacco: Never Used   Vaping Use    Vaping Use: Never used   Substance and Sexual Activity    Alcohol use: No    Drug use: Yes     Types: Marijuana Paulhugo Nim)    Sexual activity: Yes     Partners: Male   Other Topics Concern    Not on file   Social History Narrative    Not on file     Social Determinants of Health     Financial Resource Strain:     Difficulty of Paying Living Expenses: Not on file   Food Insecurity:     Worried About Running Out of Food in the Last Year: Not on file    Dorian of Food in the Last Year: Not on file   Transportation Needs:     Lack of Transportation (Medical): Not on file    Lack of Transportation (Non-Medical):  Not on file   Physical Activity:     Days of Exercise per Week: Not on file    Minutes of Exercise per Session: Not on file   Stress:     Feeling of Stress : Not on file   Social Connections:     Frequency of Communication with Friends and Family: Not on file    Frequency of Social Gatherings with Friends and Family: Not on file    Attends Christianity Services: Not on file    Active Member of [address] [place] [address] or Organizations: Not on file    Attends Club or Organization Meetings: Not on file    Marital Status: Not on file   Intimate Partner Violence:     Fear of Current or Ex-Partner: Not on file    Emotionally Abused: Not on file    Physically Abused: Not on file    Sexually Abused: Not on file   Housing Stability:     Unable to Pay for Housing in the Last Year: Not on file    Number of [place] in the Last Year: Not on file    Unstable Housing in the Last Year: Not on file     Family History   Problem Relation Age of Onset    High Blood Pressure Mother     Cancer Mother     Diabetes Mother     Depression Mother      Current Outpatient Medications   Medication Sig Dispense Refill    insulin lispro (HUMALOG) 100 UNIT/ML injection vial INJECT 50 UNITS PER PUMP EVERY DAY AS DIRECTED 20 mL 3    Blood Glucose Monitoring Suppl (ONETOUCH VERIO FLEX SYSTEM) w/Device KIT Use to check glucose 1 kit 0    ONETOUCH VERIO strip Test 5 times daily. 200 each 5    Blood Glucose Monitoring Suppl (ONETOUCH VERIO) w/Device KIT Use to check glucose 1 kit 0    Lancet Devices (ONE TOUCH DELICA LANCING DEV) MISC Use to check glucose 5 times daily. 1 each 0    OneTouch Delica Lancets 68Z MISC Test 5 times daily 200 each 5    ALPRAZolam (XANAX) 1 MG tablet Take 1 mg by mouth 4 times daily.       Lancets (ONETOUCH DELICA PLUS BGMUXF06H) MISC USE TO TEST FIVE TIMES DAILY 100 each 5    Blood Glucose Monitoring Suppl (ONE TOUCH ULTRA 2) w/Device KIT USE AS DIRECTED TO TEST BLOOD SUGAR 1 kit 0    ferrous sulfate 325 (65 Fe) MG tablet TAKE 1 TABLET BY MOUTH EVERY DAY      guanFACINE (INTUNIV) 2 MG TB24 extended release tablet TK 1 T PO QD      ibuprofen (ADVIL;MOTRIN) 800 MG tablet TAKE 1 TABLET BY MOUTH THREE TIMES A DAY      omeprazole (PRILOSEC) 20 MG delayed release capsule TAKE 1 CAPSULE BY MOUTH DAILY 30 capsule 3    gabapentin (NEURONTIN) 800 MG tablet TAKE 1 TABLET BY MOUTH THREE TIMES DAILY (Patient taking differently: Take 800 mg by mouth 4 times daily. ) 90 tablet 3    INS SYRINGE/NEEDLE .5CC/27G 27G X 1/2\" 0.5 ML MISC To be used in cse of emergency when insulin pump doesn't work 10 each 0    hydrOXYzine (VISTARIL) 50 MG capsule Take 50 mg by mouth 3 times daily as needed       glucagon 1 MG injection Inject 1 mg into the skin See Admin Instructions Follow package directions for low blood sugar. 1 kit 3     No current facility-administered medications for this visit. Allergies   Allergen Reactions    Ultram [Tramadol]      Family Status   Relation Name Status    Mother      Father  Alive     ROS  I have reviewed the review of system questionnaire filled by the patient . Patient was advised to contact PCP for non endocrine signs and symptoms     OBJECTIVE:  There were no vitals taken for this visit. Wt Readings from Last 3 Encounters:   21 150 lb 6.4 oz (68.2 kg)   20 149 lb (67.6 kg)   19 139 lb (63 kg)     Weight 148       Patient is  alert oriented to time ,place and person. Both recent and remote memory intact . Patient has weighed themselves in the last few  weeks and it has been stable         Lab Results   Component Value Date    LABA1C 8.3 2021    LABA1C 8.9 2021    LABA1C 8.9 2020         ASSESSMENT/PLAN:      1. Uncontrolled Type 1 diabetes mellitus with complication uncontrolled A1c 8.3  No new complaints, denies any hypoglycemia  She will come in a week for pump and sensor download once she gets more sensors  She feels that glucose levels have improved with her new job.   Diabetes control is inadequate she was advised to take her meal boluses 10 minutes prior to eating. She denies any unexplained hypoglycemia    Encouraged to do more accurate carbohydrate counting      Health maintenance  -pt uptodate on eye exam no retinopathy 2020  - microalb/creat elevated July 2016 normal may 2017 , normal in September 2018   On  lisinopril 2,5 mg ---we discussed javi damage in detail with her   -foot exam performed revealed mild neuropathy may 2019 vibration reduced   She has tried Lyrica , cymbalta and currently is on 800 mg of Neurontin TID per PCP      she was on metformin in the past but it was stopped --- Last DKA in dec 2015 with pump tubing issues . Eye exam march 2019 she is following with [facility]   --- Thyroid normal on palpation No discreet thyroid nodules identified on exam     Gabapentin 800 mg TID   All possible Side effects were discussed in detail with patient in detail and patient was advised to call our office if she  notes any side effects shewas given are written handout detailing side effects from the medication. Patient tells me she has been on this dose for years and has no side effects from this medication    Hyperlipidemia   ldl borderline 100 >84  She was advised to watch her diet and stick with low fat diet   tfts normal.    VIT D level was 26   Advised to increase supplement   She has been taking 5000 IUs daily she was advised to continue with increased dose she does note improvement in energy level     Migraine headaches    stable now    Chronic back pain    she was cautioned about anti-inflammatory medication and chronic kidney   She is taking antiinflammatory and it has improved     DEPRESSION/ANXIETY   Follows with psychiatry.       Reviewed and/or ordered clinical lab results Yes  Reviewed and/or ordered radiology tests Yes  Reviewed and/or ordered other diagnostic tests Yes  Made a decision to obtain old records Yes  Reviewed and summarized old records Yes      Marzena Mary Ann was counseled regarding symptoms of current diagnosis, course and complications of disease if inadequately treated, side effects of medications, diagnosis, treatment options, and prognosis, risks, benefits, complications, and alternatives of treatment, labs, imaging and other studies and treatment targets and goals. She understands instructions and counseling. TELEHEALTH EVALUATION -- Audio/Visual (During YQGDP-52 public health emergency)  Pursuant to the emergency declaration under the [facility], [facility] waiver authority and the [employer] Act, this Virtual  Visit was conducted, with patient's consent, to reduce the patient's risk of exposure to COVID-19 and provide care for  patient. Patient identification was verified and the caregiver was present when appropriate. The patient was located in the state where the provider is licensed to practice. The patient and/or guardian are aware that this is a billable service which includes applicable co-pays. This virtual/audio visit was conducted with patient and/or legal guardian's consent. Total time spent : 20+ reviewing the chart, conducting an interview, performing a limited exam by audio and educating the patient on my assessment plan. Return in about 3 months (around 7/12/2022).

## 2022-04-14 ENCOUNTER — TELEPHONE (OUTPATIENT)
Dept: ENDOCRINOLOGY | Age: 37
End: 2022-04-14

## 2022-04-14 NOTE — TELEPHONE ENCOUNTER
Fax from [address] [place] [address] for Insulin Pump & diabetes testing to be completed

## 2022-06-30 ENCOUNTER — TELEPHONE (OUTPATIENT)
Dept: ENDOCRINOLOGY | Age: 37
End: 2022-06-30

## 2022-06-30 NOTE — TELEPHONE ENCOUNTER
Fax from [phone]/ Physicians order form to complete for diabetes supplies

## 2022-07-18 ENCOUNTER — TELEPHONE (OUTPATIENT)
Dept: ENDOCRINOLOGY | Age: 37
End: 2022-07-18

## 2022-07-18 NOTE — TELEPHONE ENCOUNTER
Patient has a new pump and needs help with her settings.   Would like a call from the nurse today after 1pm.

## 2022-07-18 NOTE — TELEPHONE ENCOUNTER
Called patient back. She stated she has the settings in her pump from what she thinks they were at. I explained that the last download we have was from December 2021. Patient stated she thinks she we be okay but may need adjustments. Advised patient to come in to the office so that we can download her pump. Patient verbalized understanding.

## 2022-09-02 ENCOUNTER — TELEPHONE (OUTPATIENT)
Dept: ENDOCRINOLOGY | Age: 37
End: 2022-09-02

## 2022-09-02 NOTE — TELEPHONE ENCOUNTER
Spoke to patient. She is aware the letter will be ready on Tuesday and she will pick it up from the .

## 2022-09-02 NOTE — TELEPHONE ENCOUNTER
Pt needs a letter for work [employer]) stating that she is a diabetic type 1 and may need to take extra breaks    # [phone]

## 2022-10-20 ENCOUNTER — TELEPHONE (OUTPATIENT)
Dept: ENDOCRINOLOGY | Age: 37
End: 2022-10-20

## 2022-10-20 DIAGNOSIS — E10.65 TYPE 1 DIABETES MELLITUS WITH HYPERGLYCEMIA (HCC): ICD-10-CM

## 2022-10-20 NOTE — TELEPHONE ENCOUNTER
Called pt and advised to go to Er if she is having vomiting along with significantly elevated glucose. Patient did not  the phone so I had to leave a message.

## 2022-10-20 NOTE — TELEPHONE ENCOUNTER
Call from patient stating that her BS was over 600 this morning before breakfast  Pt stated that she has taken 42 units of insulin just today   Pt states that she was sent home from work for vomiting because she could not keep anything down    Pt stated that she was able to get her BS lowered to 316 currently     She is wanting to know if Dr. Alyson Carbajal could send another vial of  Humalog 100 unit to her pharmacy  She is using [facility] [address]     Please advise   CB# [phone]

## 2022-10-26 ENCOUNTER — TELEPHONE (OUTPATIENT)
Dept: ENDOCRINOLOGY | Age: 37
End: 2022-10-26

## 2022-10-26 DIAGNOSIS — E10.65 TYPE 1 DIABETES MELLITUS WITH HYPERGLYCEMIA (HCC): ICD-10-CM

## 2022-10-26 NOTE — TELEPHONE ENCOUNTER
Pt  calling and states she only has about 20 units left of her Humalog because she went into DKA last week and used a lot of insulin. Pharmacy is telling her she cannot get a refill until Nov 4th.     Pt goes to [facility]    CB# [phone]

## 2022-11-15 ENCOUNTER — TELEPHONE (OUTPATIENT)
Dept: ENDOCRINOLOGY | Age: 37
End: 2022-11-15

## 2022-11-15 NOTE — TELEPHONE ENCOUNTER
Pt's  [employer] calling and states pt needs a letter for work so she can take breaks if her sugar gets to low and or because she's a diabetic. He states we did a letter a couple months ago but work has either lost letter or forgot to enter it in their system by her manager.  Pt did not change jobs or did not get a new manager    Call when ready to  letter    # [phone]  Elisabet Manual

## 2022-12-05 ENCOUNTER — TELEPHONE (OUTPATIENT)
Dept: ENDOCRINOLOGY | Age: 37
End: 2022-12-05

## 2022-12-05 NOTE — TELEPHONE ENCOUNTER
Returned patients call. She stated she has new insurance and cannot use [facility] anymore. I gave her the [employer] rep's (Ayesha[employer]) number to call for assistance. She also has been out of sensors so advised that maybe she will be able to assist her in getting some temporary supplies sent to her in the meantime. Patient verbalized understanding.

## 2022-12-06 ENCOUNTER — TELEPHONE (OUTPATIENT)
Dept: ENDOCRINOLOGY | Age: 37
End: 2022-12-06

## 2023-01-05 ENCOUNTER — TELEPHONE (OUTPATIENT)
Dept: ENDOCRINOLOGY | Age: 38
End: 2023-01-05

## 2023-01-18 DIAGNOSIS — E10.9 TYPE 1 DIABETES MELLITUS WITHOUT COMPLICATION (HCC): ICD-10-CM

## 2023-01-18 RX ORDER — BLOOD SUGAR DIAGNOSTIC
STRIP MISCELLANEOUS
Qty: 200 EACH | Refills: 5 | OUTPATIENT
Start: 2023-01-18

## 2023-01-18 RX ORDER — BLOOD SUGAR DIAGNOSTIC
STRIP MISCELLANEOUS
Qty: 200 EACH | Refills: 1 | Status: SHIPPED | OUTPATIENT
Start: 2023-01-18 | End: 2023-01-25 | Stop reason: SDUPTHER

## 2023-01-25 ENCOUNTER — TELEMEDICINE (OUTPATIENT)
Dept: ENDOCRINOLOGY | Age: 38
End: 2023-01-25
Payer: COMMERCIAL

## 2023-01-25 DIAGNOSIS — M79.7 FIBROMYALGIA: Primary | ICD-10-CM

## 2023-01-25 DIAGNOSIS — E10.9 TYPE 1 DIABETES MELLITUS WITHOUT COMPLICATION (HCC): ICD-10-CM

## 2023-01-25 PROCEDURE — 3046F HEMOGLOBIN A1C LEVEL >9.0%: CPT | Performed by: INTERNAL MEDICINE

## 2023-01-25 PROCEDURE — G8427 DOCREV CUR MEDS BY ELIG CLIN: HCPCS | Performed by: INTERNAL MEDICINE

## 2023-01-25 PROCEDURE — 99213 OFFICE O/P EST LOW 20 MIN: CPT | Performed by: INTERNAL MEDICINE

## 2023-01-25 PROCEDURE — 2022F DILAT RTA XM EVC RTNOPTHY: CPT | Performed by: INTERNAL MEDICINE

## 2023-01-25 RX ORDER — BLOOD SUGAR DIAGNOSTIC
STRIP MISCELLANEOUS
Qty: 200 EACH | Refills: 5 | Status: SHIPPED | OUTPATIENT
Start: 2023-01-25

## 2023-01-25 ASSESSMENT — ENCOUNTER SYMPTOMS: VISUAL CHANGE: 0

## 2023-01-25 NOTE — PROGRESS NOTES
Marcianne Mcardle is a 44 y.o. female here for uncontrolled type 1 Diabetes . Pt was diagnosed with diabetes when she was 14years of age she had a viral infection on a vacation and was sick for almost 2 wks and was later on found to have diabetes . Pt got diabetic eduaction when she was diagnosed initialy and then didn't get any refresher . Pt was on NPH 35 and 10 of R and was switched to 40 units of NPH with 15 of R . Pt has 3 kids one set of twin , she didn't had any complications during her pregnancy and as per pt she had a good control of diabetes during her pregnancies Pt denies any change in her skin texture , denies any change in her bowel habits , denies any heat or cold intolerance , denies any recent weight change ,denies any palpitation . Pt denies any chest pain or SOB . Pt denies any numbness and tingling in her extremities    Diabetes History   Dx: DM Type I   Glucose Monitoring:   Glucose Monitoring: Yes   Glucose Testing Frequency: fasting, Test Site: fingerstick   Home Monitoring Reviewed: Verbal recall   Breakfast: Before: 230 she is not checking any other time of the day   Hypoglycemic Events:   Frequency: none   Hypoglycemia Symptoms: hunger, shaking, unawareness   Dietary Management:   Diet: no restrictions, carb counting   Exercise: Active Lifestyle   Insulin Management:   Site Rotation: yes   Injection/Injection Sites: upper arm, abdomen   Diabetic Eye Exam:   Date of last exam: jan 2014   Physician: eddy   Discussed   Comments: no retinopathy   Associated Symptoms:   Denies: anxiety, claudication, depression, non healing wounds, numbness, orthostasis, polydipsia, polyuria, sexual dysfunction, tingling, vision problems       Comorbid conditions: Neuropathy and Nephropathy  Current diabetic medications include: insulin via pump     INTERIM:    Diabetes  She presents for her follow-up diabetic visit. She has type 1 diabetes mellitus. MedicAlert identification noted.  The initial diagnosis of diabetes was made 22 years ago. Her disease course has been stable. Hypoglycemia symptoms include confusion, dizziness, nervousness/anxiousness and sweats. Associated symptoms include polyuria. Pertinent negatives for diabetes include no visual change, no weakness and no weight loss. There are no hypoglycemic complications. Pertinent negatives for hypoglycemia complications include no hospitalization, no nocturnal hypoglycemia, no required assistance and no required glucagon injection. Symptoms are stable. Diabetic complications include peripheral neuropathy. Risk factors for coronary artery disease include dyslipidemia and diabetes mellitus. Current diabetic treatment includes insulin pump. She is compliant with treatment most of the time. Her weight is stable. She is following a diabetic diet. Meal planning includes avoidance of concentrated sweets. She has not had a previous visit with a dietitian. She rarely participates in exercise. There is no change in her home blood glucose trend. Her breakfast blood glucose is taken between 7-8 am. Her breakfast blood glucose range is generally 110-130 mg/dl. Her lunch blood glucose is taken between 12-1 pm. Her lunch blood glucose range is generally 110-130 mg/dl. Her dinner blood glucose is taken between 7-8 pm. Her dinner blood glucose range is generally 140-180 mg/dl. An ACE inhibitor/angiotensin II receptor blocker is being taken. She does not see a podiatrist.Eye exam is current. Other  Pertinent negatives include no visual change or weakness. Denies any significant hypoglycemia.   She was in the ER as she injured ligament in her fingers and was not able to work for 10 days  She is gone back to work and notes that glucose have been staying relatively in a better range in the last few months    Weight trend: stable  Prior visit with dietician: yes  Current diet: on average, 3 meals per day  Current exercise: rare    Has there been any hospitalization, surgery or major illness since the last visit? No   Has there been any new school, family or social problems since the last visit? No  Has there been any new family history of members with diabetes, heart disease, stroke, or endocrine related problems since the last visit?   No    Past Medical History:   Diagnosis Date    Anemia     Anxiety     Back pain     Diabetes mellitus type 1 ([address]     Diagnosed at 14years of age; has insulin pump    Fibromyalgia     Shoulder instability     [facility]      Patient Active Problem List   Diagnosis    Anxiety     Chronic back pain    Type 1 diabetes, uncontrolled, with neuropathy    Tobacco abuse    Fibromyalgia    Schizoaffective disorder (HCC)    Gastroesophageal reflux disease without esophagitis    Drug abuse and dependence (HCC)     Past Surgical History:   Procedure Laterality Date     SECTION      LEEP      SHOULDER SURGERY Left     TUBAL LIGATION       Social History     Socioeconomic History    Marital status:      Spouse name: Not on file    Number of children: Not on file    Years of education: Not on file    Highest education level: Not on file   Occupational History    Not on file   Tobacco Use    Smoking status: Every Day     Packs/day: 1.00     Years: 12.00     Pack years: 12.00     Types: Cigarettes    Smokeless tobacco: Never   Vaping Use    Vaping Use: Never used   Substance and Sexual Activity    Alcohol use: No    Drug use: Yes     Types: Marijuana Darryle Pimple)    Sexual activity: Yes     Partners: Male   Other Topics Concern    Not on file   Social History Narrative    Not on file     Social Determinants of Health     Financial Resource Strain: Not on file   Food Insecurity: Not on file   Transportation Needs: Not on file   Physical Activity: Not on file   Stress: Not on file   Social Connections: Not on file   Intimate Partner Violence: Not on file   Housing Stability: Not on file     Family History   Problem Relation Age of Onset High Blood Pressure Mother     Cancer Mother     Diabetes Mother     Depression Mother      Current Outpatient Medications   Medication Sig Dispense Refill    ONETOUCH VERIO strip Test 5 times daily. 200 each 1    insulin lispro (HUMALOG) 100 UNIT/ML injection vial INJECT 100 UNITS PER PUMP EVERY DAY AS DIRECTED (Change in dosage) 30 mL 3    Blood Glucose Monitoring Suppl (ONETOUCH VERIO FLEX SYSTEM) w/Device KIT Use to check glucose 1 kit 0    Blood Glucose Monitoring Suppl (ONETOUCH VERIO) w/Device KIT Use to check glucose 1 kit 0    Lancet Devices (ONE TOUCH DELICA LANCING DEV) MISC Use to check glucose 5 times daily. 1 each 0    OneTouch Delica Lancets 11K MISC Test 5 times daily 200 each 5    ALPRAZolam (XANAX) 1 MG tablet Take 1 mg by mouth 4 times daily. Lancets (ONETOUCH DELICA PLUS [id number]) MISC USE TO TEST FIVE TIMES DAILY 100 each 5    Blood Glucose Monitoring Suppl (ONE TOUCH ULTRA 2) w/Device KIT USE AS DIRECTED TO TEST BLOOD SUGAR 1 kit 0    ibuprofen (ADVIL;MOTRIN) 800 MG tablet TAKE 1 TABLET BY MOUTH THREE TIMES A DAY      omeprazole (PRILOSEC) 20 MG delayed release capsule TAKE 1 CAPSULE BY MOUTH DAILY 30 capsule 3    INS SYRINGE/NEEDLE .5CC/27G 27G X 1/2\" 0.5 ML MISC To be used in cse of emergency when insulin pump doesn't work 10 each 0    glucagon 1 MG injection Inject 1 mg into the skin See Admin Instructions Follow package directions for low blood sugar. 1 kit 3    gabapentin (NEURONTIN) 800 MG tablet TAKE 1 TABLET BY MOUTH THREE TIMES DAILY (Patient taking differently: Take 800 mg by mouth 4 times daily. ) 90 tablet 3     No current facility-administered medications for this visit. Allergies   Allergen Reactions    Ultram [Tramadol]      Family Status   Relation Name Status    Mother      Father  Alive   OBJECTIVE:  There were no vitals taken for this visit.    Wt Readings from Last 3 Encounters:   21 150 lb 6.4 oz (68.2 kg)   20 149 lb (67.6 kg)   19 139 lb (63 kg)     Weight 148       Constitutional: no acute distress, well appearing and well nourished  Psychiatric: oriented to person, place and time, judgement and insight and normal, recent and remote memory intact and mood and affect are normal  Skin: skin and subcutaneous tissue is normal without visible mass,   Head and Face: visual inspection  of head and face revealed no abnormalities  Eyes: visual inspection showed no lid or conjunctival swelling, erythema or discharge, pupils are normal, equal, round  Ears/Nose: external inspection of ears and nose revealed no abnormalities, hearing is grossly normal  Oropharynx/Mouth/Face: lips, tongue and gums appear  normal with no lesions, the voice quality was normal  Neck: neck appears symmetric, with no visible masses,   Pulmonary: no increased work of breathing or signs of respiratory distress,  Musculoskeletal: normal on inspection    Neurological: normal coordination and normal general cortical function    Lab Results   Component Value Date/Time    LABA1C 8.3 12/07/2021 11:21 AM    LABA1C 8.9 02/02/2021 09:31 AM    LABA1C 8.9 06/26/2020 12:00 AM         ASSESSMENT/PLAN:      1. Uncontrolled Type 1 diabetes mellitus with complication uncontrolled A1c 8.3  No new complaints, denies any hypoglycemia  She did not get any recent blood work done she was advised to come to the lab and get her blood work done now and will also download her pump that day and will look at the data. She feels that glucose levels have improved with her new job. Diabetes control is inadequate she was advised to take her meal boluses 10 minutes prior to eating.   She denies any unexplained hypoglycemia    Encouraged to do more accurate carbohydrate counting      Health maintenance  -pt uptodate on eye exam no retinopathy 2020  - microalb/creat elevated July 2016 normal may 2017 , normal in September 2018   On  lisinopril 2,5 mg ---we discussed kideny damage in detail with her   -foot exam performed revealed mild neuropathy may 2019 vibration reduced   She has tried Lyrica , cymbalta and currently is on 800 mg of Neurontin TID per PCP      she was on metformin in the past but it was stopped --- Last DKA in dec 2015 with pump tubing issues . Eye exam march 2019 she is [facility] with [facility]   --- Thyroid normal on palpation No discreet thyroid nodules identified on exam     Gabapentin 800 mg TID   All possible Side effects were discussed in detail with patient in detail and patient was advised to call our office if she  notes any side effects shewas given are written handout detailing side effects from the medication. Patient tells me she has been on this dose for years and has no side effects from this medication    Hyperlipidemia   ldl borderline 100 >84  She was advised to watch her diet and stick with low fat diet   tfts normal.    VIT D level was 26   Advised to increase supplement   She has been taking 5000 IUs daily she was advised to continue with increased dose she does note improvement in energy level     Migraine headaches    stable now    Chronic back pain    she was cautioned about anti-inflammatory medication and chronic kidney   She is taking antiinflammatory and it has improved     DEPRESSION/ANXIETY   Follows with psychiatry. Reviewed and/or ordered clinical lab results Yes  Reviewed and/or ordered radiology tests Yes  Reviewed and/or ordered other diagnostic tests Yes  Made a decision to obtain old records Yes  Reviewed and summarized old records Yes      Joanne Taylor was counseled regarding symptoms of current diagnosis, course and complications of disease if inadequately treated, side effects of medications, diagnosis, treatment options, and prognosis, risks, benefits, complications, and alternatives of treatment, labs, imaging and other studies and treatment targets and goals. She understands instructions and counseling.   TELEHEALTH EVALUATION -- Audio/Visual (During [id number]-[id number] public health emergency)  Pursuant to the emergency declaration under the [facility] waiver authority and the [employer] and [employer] Act, this Virtual  Visit was conducted, with patient's consent, to reduce the patient's risk of exposure to COVID-19 and provide care for  patient. Patient identification was verified and the caregiver was present when appropriate. The patient was located in the state where the provider is licensed to practice. The patient and/or guardian are aware that this is a billable service which includes applicable co-pays. This virtual/audio visit was conducted with patient and/or legal guardian's consent. Total time spent : 20+ reviewing the chart, conducting an interview, performing a limited exam by audio and educating the patient on my assessment plan. No follow-ups on file.

## 2023-02-17 ENCOUNTER — TELEPHONE (OUTPATIENT)
Dept: ENDOCRINOLOGY | Age: 38
End: 2023-02-17

## 2023-02-17 DIAGNOSIS — E10.65 TYPE 1 DIABETES MELLITUS WITH HYPERGLYCEMIA (HCC): ICD-10-CM

## 2023-02-17 NOTE — TELEPHONE ENCOUNTER
Pt calling to let us know that she is filling out [place] papers for her work because she has missed work for DKA

## 2023-02-20 RX ORDER — INSULIN LISPRO 100 [IU]/ML
INJECTION, SOLUTION INTRAVENOUS; SUBCUTANEOUS
Qty: 30 ML | Refills: 5 | Status: SHIPPED | OUTPATIENT
Start: 2023-02-20

## 2023-03-07 ENCOUNTER — TELEPHONE (OUTPATIENT)
Dept: ENDOCRINOLOGY | Age: 38
End: 2023-03-07
Payer: COMMERCIAL

## 2023-03-07 DIAGNOSIS — E10.65 TYPE 1 DIABETES MELLITUS WITH HYPERGLYCEMIA (HCC): Primary | ICD-10-CM

## 2023-03-07 PROCEDURE — 95251 CONT GLUC MNTR ANALYSIS I&R: CPT | Performed by: INTERNAL MEDICINE

## 2023-03-08 NOTE — TELEPHONE ENCOUNTER
Diabetes Continuous Glucose Monitoring Report         Reason for Study:     - improve diabetic control without risk of hypoglycemia       Current Medication regimen:     Insulin pump 630 G  CGMS Report     CGMS data collection was performed on March 2023  Patient provided information on her  diet, activities and insulin dosing  during this period. Data was available for 6 days     Sensor Data Report:   - 12 AM to 6 AM: Overnight blood glucose pattern shows hyperglycemia  - 6   AM to 10 AM:  Post breakfast hyperglycemia is noted  --10AM to 5 PM : No hypoglycemia observed during this time. - 5   PM to 8 PM: Post meal  is noted       Average reading 227 mg/dL     Standard Dev   84 mg/dL   % of time <70 mg/dL 1%   % of time >180  mg/dL 70%   % of time within range 29%  Number of hypoglycemia episodes noted: 0     Impression:   CGMS shows overall hypoglycemia overnight     Recommendation:      Patient was advised to make the following changes in her diabetic regimen  Increase midnight basal rate to 1.4 up until 6 AM when the basal rate is already 1.5.   We will try to get updated insulin pump

## 2023-03-08 NOTE — TELEPHONE ENCOUNTER
Patient aware. Sent the information via [employer] as well because she was driving. She will check into the pumps.

## 2023-03-09 ENCOUNTER — TELEPHONE (OUTPATIENT)
Dept: ENDOCRINOLOGY | Age: 38
End: 2023-03-09

## 2023-03-12 ENCOUNTER — PATIENT MESSAGE (OUTPATIENT)
Dept: ENDOCRINOLOGY | Age: 38
End: 2023-03-12

## 2023-03-12 DIAGNOSIS — E10.65 TYPE 1 DIABETES MELLITUS WITH HYPERGLYCEMIA (HCC): Primary | ICD-10-CM

## 2023-03-13 RX ORDER — BLOOD-GLUCOSE METER
KIT MISCELLANEOUS
Qty: 200 EACH | Refills: 5 | Status: SHIPPED | OUTPATIENT
Start: 2023-03-13

## 2023-03-13 NOTE — TELEPHONE ENCOUNTER
From: Danya Uribe  To: Dr. Man Malhotra  Sent: 3/12/2023 3:52 PM EDT  Subject: Just a ?     I just have a question I used to Freestyle light test strips at work on my sugar machine I leave it at work but I don't have no test strips for it because I use the other one at home how can I do how can I get the test strips the freestyle white ones for work will they let me get both of them

## 2023-05-22 LAB
A/G RATIO: 1.3 (ref 1–2)
ALBUMIN SERPL-MCNC: 4 G/DL (ref 3.5–5.7)
ALBUMIN/PROTEIN TOTAL, SER/PL: 7.2 G/DL (ref 6–8.3)
ALP BLD-CCNC: 72 U/L (ref 34–104)
ALT SERPL-CCNC: 19 U/L (ref 7–52)
ANION GAP 4: 8 MMOL/L (ref 7–16)
AST W/O P-5'-P: 20 U/L (ref 13–39)
BILIRUB SERPL-MCNC: 0.4 MG/DL (ref 0.3–1)
BUN BLDV-MCNC: 11 MG/DL (ref 7–25)
CALCIUM SERPL-MCNC: 9.3 MG/DL (ref 8.6–10.2)
CHLORIDE BLD-SCNC: 105 MMOL/L (ref 98–107)
CHOLESTEROL, STONE: 152 MG/DL
CO2: 26 MMOL/L (ref 21–31)
CREAT SERPL-MCNC: 0.62 MG/DL (ref 0.6–1.2)
CREATININE URINE: 69.78 MG/DL
DEPRECATED MEAN BLOOD GLUCOSE: 180 MG/DL (ref 68–126)
EGFR FEMALE: > 90 ML/MIN/1.73M2
GLOBULIN: 3.2 G/DL (ref 2.6–4.2)
GLUCOSE: 113 MG/DL (ref 74–109)
HBA1C MFR BLD: 7.9 % (ref 4–6)
HDLC SERPL-MCNC: 57 MG/DL (ref 40–60)
LDL CHOLESTEROL CALCULATED: 86 MG/DL (ref 0–99)
MICROALBUMIN UR-MCNC: < 7 MG/L
MICROALBUMIN/CREAT UR-RTO: NORMAL
POTASSIUM SERPL-SCNC: 4.2 MMOL/L (ref 3.5–5.1)
SODIUM BLD-SCNC: 139 MMOL/L (ref 136–145)
TRIGL SERPL-MCNC: 44 MG/DL
TSH ULTRASENSITIVE: 1.1 UIU/ML (ref 0.45–5.33)
VLDLC SERPL CALC-MCNC: 9 MG/DL (ref 0–40)

## 2023-05-23 ENCOUNTER — TELEPHONE (OUTPATIENT)
Dept: ENDOCRINOLOGY | Age: 38
End: 2023-05-23

## 2023-06-07 ENCOUNTER — TELEPHONE (OUTPATIENT)
Dept: ENDOCRINOLOGY | Age: 38
End: 2023-06-07

## 2023-06-18 DIAGNOSIS — E10.65 TYPE 1 DIABETES MELLITUS WITH HYPERGLYCEMIA (HCC): ICD-10-CM

## 2023-06-18 DIAGNOSIS — E10.9 TYPE 1 DIABETES MELLITUS WITHOUT COMPLICATION (HCC): ICD-10-CM

## 2023-06-19 RX ORDER — BLOOD-GLUCOSE METER
KIT MISCELLANEOUS
Qty: 200 EACH | Refills: 5 | Status: SHIPPED | OUTPATIENT
Start: 2023-06-19

## 2023-06-19 RX ORDER — BLOOD SUGAR DIAGNOSTIC
STRIP MISCELLANEOUS
Qty: 200 EACH | Refills: 5 | Status: SHIPPED | OUTPATIENT
Start: 2023-06-19

## 2023-07-03 ENCOUNTER — TELEPHONE (OUTPATIENT)
Dept: ENDOCRINOLOGY | Age: 38
End: 2023-07-03

## 2023-07-19 ENCOUNTER — OFFICE VISIT (OUTPATIENT)
Dept: ENDOCRINOLOGY | Age: 38
End: 2023-07-19
Payer: COMMERCIAL

## 2023-07-19 VITALS
HEART RATE: 85 BPM | WEIGHT: 144 LBS | SYSTOLIC BLOOD PRESSURE: 126 MMHG | BODY MASS INDEX: 25.52 KG/M2 | DIASTOLIC BLOOD PRESSURE: 78 MMHG | HEIGHT: 63 IN | TEMPERATURE: 98 F | RESPIRATION RATE: 14 BRPM

## 2023-07-19 DIAGNOSIS — E78.2 MIXED HYPERLIPIDEMIA: ICD-10-CM

## 2023-07-19 DIAGNOSIS — E10.65 UNCONTROLLED TYPE 1 DIABETES MELLITUS WITH HYPERGLYCEMIA (HCC): Primary | ICD-10-CM

## 2023-07-19 DIAGNOSIS — M79.7 FIBROMYALGIA: Chronic | ICD-10-CM

## 2023-07-19 PROCEDURE — 2022F DILAT RTA XM EVC RTNOPTHY: CPT | Performed by: INTERNAL MEDICINE

## 2023-07-19 PROCEDURE — G8427 DOCREV CUR MEDS BY ELIG CLIN: HCPCS | Performed by: INTERNAL MEDICINE

## 2023-07-19 PROCEDURE — 4004F PT TOBACCO SCREEN RCVD TLK: CPT | Performed by: INTERNAL MEDICINE

## 2023-07-19 PROCEDURE — 3051F HG A1C>EQUAL 7.0%<8.0%: CPT | Performed by: INTERNAL MEDICINE

## 2023-07-19 PROCEDURE — 95251 CONT GLUC MNTR ANALYSIS I&R: CPT | Performed by: INTERNAL MEDICINE

## 2023-07-19 PROCEDURE — 99214 OFFICE O/P EST MOD 30 MIN: CPT | Performed by: INTERNAL MEDICINE

## 2023-07-19 PROCEDURE — G8419 CALC BMI OUT NRM PARAM NOF/U: HCPCS | Performed by: INTERNAL MEDICINE

## 2023-07-19 RX ORDER — LORATADINE 10 MG/1
10 TABLET ORAL DAILY
COMMUNITY
Start: 2023-06-29

## 2023-07-19 RX ORDER — HYDROXYZINE HYDROCHLORIDE 25 MG/1
TABLET, FILM COATED ORAL
COMMUNITY
Start: 2023-06-29

## 2023-07-19 ASSESSMENT — ENCOUNTER SYMPTOMS: VISUAL CHANGE: 0

## 2023-07-19 NOTE — PROGRESS NOTES
Michael Powell is a 44 y.o. female here for uncontrolled type 1 Diabetes . Pt was diagnosed with diabetes when she was 14years of age she had a viral infection on a vacation and was sick for almost 2 wks and was later on found to have diabetes . Pt got diabetic eduaction when she was diagnosed initialy and then didn't get any refresher . Pt was on NPH 35 and 10 of R and was switched to 40 units of NPH with 15 of R . Pt has 3 kids one set of twin , she didn't had any complications during her pregnancy and as per pt she had a good control of diabetes during her pregnancies Pt denies any change in her skin texture , denies any change in her bowel habits , denies any heat or cold intolerance , denies any recent weight change ,denies any palpitation . Pt denies any chest pain or SOB . Pt denies any numbness and tingling in her extremities    Diabetes History   Dx: DM Type I   Glucose Monitoring:   Glucose Monitoring: Yes   Glucose Testing Frequency: fasting, Test Site: fingerstick   Home Monitoring Reviewed: Verbal recall   Breakfast: Before: 230 she is not checking any other time of the day   Hypoglycemic Events:   Frequency: none   Hypoglycemia Symptoms: hunger, shaking, unawareness   Dietary Management:   Diet: no restrictions, carb counting   Exercise: Active Lifestyle   Insulin Management:   Site Rotation: yes   Injection/Injection Sites: upper arm, abdomen   Diabetic Eye Exam:   Date of last exam: jan 2014   Physician: eddy   Discussed   Comments: no retinopathy   Associated Symptoms:   Denies: anxiety, claudication, depression, non healing wounds, numbness, orthostasis, polydipsia, polyuria, sexual dysfunction, tingling, vision problems       Comorbid conditions: Neuropathy and Nephropathy  Current diabetic medications include: insulin via pump     INTERIM:    Diabetes  She presents for her follow-up diabetic visit. She has type 1 diabetes mellitus. MedicAlert identification noted.  The initial diagnosis of diabetes

## 2023-07-31 ENCOUNTER — TELEPHONE (OUTPATIENT)
Dept: ENDOCRINOLOGY | Age: 38
End: 2023-07-31

## 2023-08-13 DIAGNOSIS — E10.65 TYPE 1 DIABETES MELLITUS WITH HYPERGLYCEMIA (HCC): ICD-10-CM

## 2023-08-14 RX ORDER — INSULIN LISPRO 100 [IU]/ML
INJECTION, SOLUTION INTRAVENOUS; SUBCUTANEOUS
Qty: 30 ML | Refills: 5 | Status: SHIPPED | OUTPATIENT
Start: 2023-08-14

## 2023-08-21 ENCOUNTER — TELEPHONE (OUTPATIENT)
Dept: ENDOCRINOLOGY | Age: 38
End: 2023-08-21

## 2023-08-21 NOTE — TELEPHONE ENCOUNTER
Fax from [employer] w/ approval for insulin pump and sensors and transmitters from 8/11/23-8/11/24

## 2023-09-21 ENCOUNTER — TELEPHONE (OUTPATIENT)
Dept: ENDOCRINOLOGY | Age: 38
End: 2023-09-21

## 2023-09-21 NOTE — TELEPHONE ENCOUNTER
Called patient back and gave her the number for Dominga Velez ([employer] ) [phone]. No other questions or concerns.

## 2023-09-21 NOTE — TELEPHONE ENCOUNTER
Pt calling and states she received her new pump and sensor and will need phone #'s of the reps to help her get them both set up.  She rec'd the [address] [place] [address] sensor    CB# [phone] (this # was not listed in pt's chart and was added)

## 2023-10-09 ENCOUNTER — TELEPHONE (OUTPATIENT)
Dept: ENDOCRINOLOGY | Age: 38
End: 2023-10-09

## 2023-10-09 DIAGNOSIS — E10.65 TYPE 1 DIABETES MELLITUS WITH HYPERGLYCEMIA (HCC): Primary | ICD-10-CM

## 2023-10-09 RX ORDER — BLOOD SUGAR DIAGNOSTIC
STRIP MISCELLANEOUS
Qty: 200 EACH | Refills: 4 | Status: SHIPPED | OUTPATIENT
Start: 2023-10-09

## 2023-10-09 NOTE — TELEPHONE ENCOUNTER
Pt requesting she needs accu check guide test strips for pt 780 pump.   Pt is asking to have it sent into:      [address] [place] [address]   [address], [address] [place]    Ph: [phone]

## 2023-12-05 ENCOUNTER — TELEMEDICINE (OUTPATIENT)
Dept: ENDOCRINOLOGY | Age: 38
End: 2023-12-05

## 2023-12-05 DIAGNOSIS — E10.42 DIABETIC POLYNEUROPATHY ASSOCIATED WITH TYPE 1 DIABETES MELLITUS (HCC): ICD-10-CM

## 2023-12-05 DIAGNOSIS — E10.65 TYPE 1 DIABETES MELLITUS WITH HYPERGLYCEMIA (HCC): Primary | ICD-10-CM

## 2023-12-05 DIAGNOSIS — M79.7 FIBROMYALGIA: ICD-10-CM

## 2023-12-05 DIAGNOSIS — E78.2 MIXED HYPERLIPIDEMIA: ICD-10-CM

## 2023-12-05 RX ORDER — BLOOD SUGAR DIAGNOSTIC
STRIP MISCELLANEOUS
Qty: 200 EACH | Refills: 3 | Status: SHIPPED | OUTPATIENT
Start: 2023-12-05

## 2023-12-05 ASSESSMENT — ENCOUNTER SYMPTOMS: VISUAL CHANGE: 0

## 2023-12-05 NOTE — PROGRESS NOTES
metformin in the past but it was stopped --- Last DKA in dec 2015 with pump tubing issues . Eye exam march 2019 she is following with [facility] eye [facility]   --- Thyroid normal on palpation No discreet thyroid nodules identified on exam     Gabapentin 800 mg TID   All possible Side effects were discussed in detail with patient in detail and patient was advised to call our office if she  notes any side effects shewas given are written handout detailing side effects from the medication. Patient tells me she has been on this dose for years and has no side effects from this medication    Hyperlipidemia   ldl borderline 100 >84  She was advised to watch her diet and stick with low fat diet   tfts normal.    VIT D level was 26   Advised to increase supplement   She has been taking 5000 IUs daily she was advised to continue with increased dose she does note improvement in energy level     Migraine headaches    stable now    Chronic back pain    she was cautioned about anti-inflammatory medication and chronic kidney   She is taking antiinflammatory and it has improved     DEPRESSION/ANXIETY   Follows with psychiatry. Reviewed and/or ordered clinical lab results Yes  Reviewed and/or ordered radiology tests Yes  Reviewed and/or ordered other diagnostic tests Yes  Made a decision to obtain old records Yes  Reviewed and summarized old records Yes      Jose J Stanley was counseled regarding symptoms of current diagnosis, course and complications of disease if inadequately treated, side effects of medications, diagnosis, treatment options, and prognosis, risks, benefits, complications, and alternatives of treatment, labs, imaging and other studies and treatment targets and goals. She understands instructions and counseling.      Diabetes Continuous Glucose Monitoring Report         Reason for Study:     - improve diabetic control without risk of hypoglycemia       Current Medication regimen:        CGMS Report     CGMS

## 2024-03-07 ENCOUNTER — TELEPHONE (OUTPATIENT)
Dept: ENDOCRINOLOGY | Age: 39
End: 2024-03-07

## 2024-03-08 NOTE — TELEPHONE ENCOUNTER
Having ortho surgery on elbow and wrist on 3/14 and then another on her shoulder on or around 3/26.    Needs to know how to handle insulin and pump as she is NPO after midnight the night before procedures.     She is unsure if she will have to take off pump during surgeries.     Patient okay with hearing back from MD on Monday.

## 2024-03-08 NOTE — TELEPHONE ENCOUNTER
LMOM for patient to either upload pump data at home or bring pump in on Monday or Tuesday to have it done in the office.

## 2024-04-22 ENCOUNTER — TELEPHONE (OUTPATIENT)
Dept: ENDOCRINOLOGY | Age: 39
End: 2024-04-22

## 2024-06-06 ENCOUNTER — TELEPHONE (OUTPATIENT)
Dept: ENDOCRINOLOGY | Age: 39
End: 2024-06-06

## 2024-06-06 NOTE — TELEPHONE ENCOUNTER
LMOM for patient to call office to schedule appt asap to avoid interruption in delivery of diabetic supplies. Appt on 6/11 at 2:40 currently being held for her.

## 2024-06-11 ENCOUNTER — OFFICE VISIT (OUTPATIENT)
Dept: ENDOCRINOLOGY | Age: 39
End: 2024-06-11
Payer: COMMERCIAL

## 2024-06-11 VITALS
HEIGHT: 63 IN | DIASTOLIC BLOOD PRESSURE: 78 MMHG | HEART RATE: 86 BPM | BODY MASS INDEX: 25.87 KG/M2 | SYSTOLIC BLOOD PRESSURE: 116 MMHG | RESPIRATION RATE: 16 BRPM | WEIGHT: 146 LBS

## 2024-06-11 DIAGNOSIS — E10.42 DIABETIC POLYNEUROPATHY ASSOCIATED WITH TYPE 1 DIABETES MELLITUS (HCC): ICD-10-CM

## 2024-06-11 DIAGNOSIS — E10.65 TYPE 1 DIABETES MELLITUS WITH HYPERGLYCEMIA (HCC): Primary | ICD-10-CM

## 2024-06-11 DIAGNOSIS — M79.7 FIBROMYALGIA: Chronic | ICD-10-CM

## 2024-06-11 DIAGNOSIS — E78.2 MIXED HYPERLIPIDEMIA: ICD-10-CM

## 2024-06-11 PROCEDURE — G8427 DOCREV CUR MEDS BY ELIG CLIN: HCPCS | Performed by: INTERNAL MEDICINE

## 2024-06-11 PROCEDURE — 4004F PT TOBACCO SCREEN RCVD TLK: CPT | Performed by: INTERNAL MEDICINE

## 2024-06-11 PROCEDURE — 95251 CONT GLUC MNTR ANALYSIS I&R: CPT | Performed by: INTERNAL MEDICINE

## 2024-06-11 PROCEDURE — 99214 OFFICE O/P EST MOD 30 MIN: CPT | Performed by: INTERNAL MEDICINE

## 2024-06-11 PROCEDURE — 3046F HEMOGLOBIN A1C LEVEL >9.0%: CPT | Performed by: INTERNAL MEDICINE

## 2024-06-11 PROCEDURE — 2022F DILAT RTA XM EVC RTNOPTHY: CPT | Performed by: INTERNAL MEDICINE

## 2024-06-11 PROCEDURE — G8419 CALC BMI OUT NRM PARAM NOF/U: HCPCS | Performed by: INTERNAL MEDICINE

## 2024-06-11 RX ORDER — LANCETS
EACH MISCELLANEOUS
Qty: 200 EACH | Refills: 5 | Status: SHIPPED | OUTPATIENT
Start: 2024-06-11

## 2024-06-11 RX ORDER — METHOCARBAMOL 500 MG/1
500 TABLET, FILM COATED ORAL 4 TIMES DAILY
COMMUNITY

## 2024-06-11 RX ORDER — BLOOD SUGAR DIAGNOSTIC
STRIP MISCELLANEOUS
Qty: 200 EACH | Refills: 5 | Status: SHIPPED | OUTPATIENT
Start: 2024-06-11

## 2024-06-11 RX ORDER — OXYCODONE HYDROCHLORIDE AND ACETAMINOPHEN 5; 325 MG/1; MG/1
1 TABLET ORAL EVERY 8 HOURS PRN
COMMUNITY
Start: 2024-03-14 | End: 2024-06-17

## 2024-06-11 ASSESSMENT — ENCOUNTER SYMPTOMS: VISUAL CHANGE: 0

## 2024-06-11 NOTE — PROGRESS NOTES
complications of disease if inadequately treated, side effects of medications, diagnosis, treatment options, and prognosis, risks, benefits, complications, and alternatives of treatment, labs, imaging and other studies and treatment targets and goals.  She understands instructions and counseling.     Diabetes Continuous Glucose Monitoring Report         Reason for Study:     - improve diabetic control without risk of hypoglycemia       Current Medication regimen:        CGMS Report     CGMS data collection was performed on June 11, 2024   Patient provided information on her  diet, activities and insulin dosing  during this period.   Data was available for  14  days     Sensor Data Report:   - 12 AM to 6 AM: Overnight blood glucose pattern shows wide fluctuation   - 6   AM to 10 AM:  Post breakfast hyperglycemia is noted  --10AM to 5 PM : no  hypoglycemia observed during this time.  - 5   PM to 8 PM: Post meal hyperglycemia  is noted       Average reading 171 mg/dL     Standard Dev 50 mg/dL   % of time <70 mg/dL 1 %   % of time >180  mg/dL 37 %   % of time within range 62  %  Number of hypoglycemia episodes noted: 0     Impression:   CGMS shows wide fluctuation in glucose  mostly food related      Recommendation:      Patient was advised to make the following changes in her diabetic regimen  Take meal boluses regularly and ensure taking 10 minutes before eating                Return in about 3 months (around 9/11/2024).

## 2024-06-24 ENCOUNTER — TELEPHONE (OUTPATIENT)
Dept: ENDOCRINOLOGY | Age: 39
End: 2024-06-24

## 2024-07-16 ENCOUNTER — TELEPHONE (OUTPATIENT)
Dept: ENDOCRINOLOGY | Age: 39
End: 2024-07-16

## 2024-08-23 ENCOUNTER — TELEPHONE (OUTPATIENT)
Dept: ENDOCRINOLOGY | Age: 39
End: 2024-08-23

## 2024-08-23 DIAGNOSIS — E10.65 TYPE 1 DIABETES MELLITUS WITH HYPERGLYCEMIA (HCC): ICD-10-CM

## 2024-08-23 NOTE — TELEPHONE ENCOUNTER
Refill is needed    insulin lispro (HUMALOG) 100 UNIT/ML SOLN injection via   [id number] - [place], [place] - [address] - P [phone] - F [phone]  [address] [facility] [facility] [address]  Phone: [phone]  Fax: [phone]

## 2024-08-26 RX ORDER — INSULIN LISPRO 100 [IU]/ML
INJECTION, SOLUTION INTRAVENOUS; SUBCUTANEOUS
Qty: 30 ML | Refills: 5 | Status: SHIPPED | OUTPATIENT
Start: 2024-08-26

## 2024-10-01 ENCOUNTER — TELEPHONE (OUTPATIENT)
Dept: ENDOCRINOLOGY | Age: 39
End: 2024-10-01

## 2024-10-14 ENCOUNTER — TELEPHONE (OUTPATIENT)
Dept: ENDOCRINOLOGY | Age: 39
End: 2024-10-14

## 2024-10-21 ENCOUNTER — PATIENT MESSAGE (OUTPATIENT)
Dept: ENDOCRINOLOGY | Age: 39
End: 2024-10-21

## 2024-10-22 ENCOUNTER — OFFICE VISIT (OUTPATIENT)
Dept: ENDOCRINOLOGY | Age: 39
End: 2024-10-22
Payer: COMMERCIAL

## 2024-10-22 VITALS
HEART RATE: 89 BPM | RESPIRATION RATE: 14 BRPM | HEIGHT: 63 IN | BODY MASS INDEX: 25.16 KG/M2 | WEIGHT: 142 LBS | DIASTOLIC BLOOD PRESSURE: 63 MMHG | TEMPERATURE: 98 F | SYSTOLIC BLOOD PRESSURE: 114 MMHG

## 2024-10-22 DIAGNOSIS — E10.65 TYPE 1 DIABETES MELLITUS WITH HYPERGLYCEMIA (HCC): Primary | ICD-10-CM

## 2024-10-22 DIAGNOSIS — M79.7 FIBROMYALGIA: ICD-10-CM

## 2024-10-22 DIAGNOSIS — E10.42 DIABETIC POLYNEUROPATHY ASSOCIATED WITH TYPE 1 DIABETES MELLITUS (HCC): ICD-10-CM

## 2024-10-22 DIAGNOSIS — E78.2 MIXED HYPERLIPIDEMIA: ICD-10-CM

## 2024-10-22 PROCEDURE — G8427 DOCREV CUR MEDS BY ELIG CLIN: HCPCS | Performed by: INTERNAL MEDICINE

## 2024-10-22 PROCEDURE — 99214 OFFICE O/P EST MOD 30 MIN: CPT | Performed by: INTERNAL MEDICINE

## 2024-10-22 PROCEDURE — G8419 CALC BMI OUT NRM PARAM NOF/U: HCPCS | Performed by: INTERNAL MEDICINE

## 2024-10-22 PROCEDURE — 95251 CONT GLUC MNTR ANALYSIS I&R: CPT | Performed by: INTERNAL MEDICINE

## 2024-10-22 PROCEDURE — 3046F HEMOGLOBIN A1C LEVEL >9.0%: CPT | Performed by: INTERNAL MEDICINE

## 2024-10-22 PROCEDURE — 4004F PT TOBACCO SCREEN RCVD TLK: CPT | Performed by: INTERNAL MEDICINE

## 2024-10-22 PROCEDURE — 2022F DILAT RTA XM EVC RTNOPTHY: CPT | Performed by: INTERNAL MEDICINE

## 2024-10-22 PROCEDURE — G8484 FLU IMMUNIZE NO ADMIN: HCPCS | Performed by: INTERNAL MEDICINE

## 2024-10-22 RX ORDER — BLOOD SUGAR DIAGNOSTIC
STRIP MISCELLANEOUS
Qty: 200 EACH | Refills: 3 | Status: SHIPPED | OUTPATIENT
Start: 2024-10-22

## 2024-10-22 RX ORDER — INSULIN LISPRO 100 [IU]/ML
INJECTION, SOLUTION INTRAVENOUS; SUBCUTANEOUS
Qty: 30 ML | Refills: 5 | Status: SHIPPED | OUTPATIENT
Start: 2024-10-22

## 2024-10-22 NOTE — PROGRESS NOTES
Vivienne Wilburn is a 39 y.o. female here for uncontrolled type 1 Diabetes .Pt was diagnosed with diabetes when she was 11 years of age she had a viral infection on a vacation and was sick for almost 2 wks and was later on found to have diabetes . Pt got diabetic eduaction when she was diagnosed initialy and then didn't get any refresher .Pt was previously on NPH 35 and 10 of R and was switched to 40 units of NPH with 15 of R .  Pt has 3 kids one set of twin , she didn't had any complications during her pregnancy and as per pt she had a good control of diabetes during her pregnancies Diabetes History   Comorbid conditions: Neuropathy and Nephropathy  Current diabetic medications include: insulin via pump   She had Surgical Arthroscopy Right Shoulder with Rotator Cuff Debridement; Subacromial Decompression; Labral Debridement and Biceps Tenotomy in may 17, 2024   She has hx of severe anxiety and has been on xanax for decades as per PCP     INTERIM:      She has been under a lot of stress as her dad passed away and then her PCP fired her and her whole family as there was an issue with Vivienne's  ( who was working on [employer]  roof )   Does not take meal boluses ahead of time but is working on it      Past Medical History:   Diagnosis Date    Anemia     Anxiety     Back pain     Diabetes mellitus type 1 (HCC)     Diagnosed at 11 years of age; has insulin pump    Fibromyalgia     Shoulder instability     cortizone shot for torn AC joint      Patient Active Problem List   Diagnosis    Anxiety     Chronic back pain    Type 1 diabetes, uncontrolled, with neuropathy    Tobacco abuse    Fibromyalgia    Schizoaffective disorder (HCC)    Gastroesophageal reflux disease without esophagitis    Drug abuse and dependence (HCC)     Past Surgical History:   Procedure Laterality Date     SECTION      LEEP      SHOULDER SURGERY Left     TUBAL LIGATION       Social History     Socioeconomic History    Marital status:

## 2025-02-26 ENCOUNTER — TELEMEDICINE (OUTPATIENT)
Dept: ENDOCRINOLOGY | Age: 40
End: 2025-02-26
Payer: COMMERCIAL

## 2025-02-26 DIAGNOSIS — E10.42 DIABETIC POLYNEUROPATHY ASSOCIATED WITH TYPE 1 DIABETES MELLITUS (HCC): Primary | ICD-10-CM

## 2025-02-26 DIAGNOSIS — E10.65 TYPE 1 DIABETES MELLITUS WITH HYPERGLYCEMIA (HCC): ICD-10-CM

## 2025-02-26 DIAGNOSIS — E78.2 MIXED HYPERLIPIDEMIA: ICD-10-CM

## 2025-02-26 PROCEDURE — 99214 OFFICE O/P EST MOD 30 MIN: CPT | Performed by: INTERNAL MEDICINE

## 2025-02-26 PROCEDURE — 2022F DILAT RTA XM EVC RTNOPTHY: CPT | Performed by: INTERNAL MEDICINE

## 2025-02-26 PROCEDURE — G8427 DOCREV CUR MEDS BY ELIG CLIN: HCPCS | Performed by: INTERNAL MEDICINE

## 2025-02-26 PROCEDURE — 3046F HEMOGLOBIN A1C LEVEL >9.0%: CPT | Performed by: INTERNAL MEDICINE

## 2025-02-26 RX ORDER — INSULIN LISPRO 100 [IU]/ML
INJECTION, SOLUTION INTRAVENOUS; SUBCUTANEOUS
Qty: 90 ML | Refills: 1 | Status: SHIPPED | OUTPATIENT
Start: 2025-02-26

## 2025-02-26 RX ORDER — FLUOXETINE HYDROCHLORIDE 40 MG/1
CAPSULE ORAL
COMMUNITY
Start: 2025-02-06

## 2025-02-26 NOTE — PROGRESS NOTES
Vivienne Wilburn is a 39 y.o. female here for uncontrolled type 1 Diabetes .Pt was diagnosed with diabetes when she was 11 years of age she had a viral infection on a vacation and was sick for almost 2 wks and was later on found to have diabetes . Pt got diabetic eduaction when she was diagnosed initialy and then didn't get any refresher .Pt was previously on NPH 35 and 10 of R and was switched to 40 units of NPH with 15 of R .  Pt has 3 kids one set of twin , she didn't had any complications during her pregnancy and as per pt she had a good control of diabetes during her pregnancies Diabetes History   Comorbid conditions: Neuropathy and Nephropathy  Current diabetic medications include: insulin via pump   She had Surgical Arthroscopy Right Shoulder with Rotator Cuff Debridement; Subacromial Decompression; Labral Debridement and Biceps Tenotomy in may 17, 2024   She has hx of severe anxiety and has been on xanax for decades as per PCP   She has been under a lot of stress as her dad passed away and then her PCP fired her and her whole family as there was an issue with Vivienne's  ( who was working on [employer]  roof )     INTERIM:    Does not take meal boluses ahead of time but is working on it  She started antidepressant       Past Medical History:   Diagnosis Date    Anemia     Anxiety     Back pain     Diabetes mellitus type 1 (HCC)     Diagnosed at 11 years of age; has insulin pump    Fibromyalgia     Shoulder instability     cortizone shot for torn AC joint      Patient Active Problem List   Diagnosis    Anxiety     Chronic back pain    Type 1 diabetes, uncontrolled, with neuropathy    Tobacco abuse    Fibromyalgia    Schizoaffective disorder (HCC)    Gastroesophageal reflux disease without esophagitis    Drug abuse and dependence (HCC)     Past Surgical History:   Procedure Laterality Date     SECTION      LEEP      SHOULDER SURGERY Left     TUBAL LIGATION       Social History     Socioeconomic

## 2025-02-27 ENCOUNTER — TELEPHONE (OUTPATIENT)
Dept: ENDOCRINOLOGY | Age: 40
End: 2025-02-27

## 2025-02-27 DIAGNOSIS — E10.65 TYPE 1 DIABETES MELLITUS WITH HYPERGLYCEMIA (HCC): Primary | ICD-10-CM

## 2025-02-27 NOTE — TELEPHONE ENCOUNTER
Diabetes Continuous Glucose Monitoring Report         Reason for Study:     - improve diabetic control without risk of hypoglycemia       Current Medication regimen:   Insulin pump     CGMS Report     CGMS data collection was performed on 2/27/2025  Patient provided information on her  diet, activities and insulin dosing  during this period.   Data was available for 7+ days     Sensor Data Report:   - 12 AM to 6 AM: Overnight blood glucose pattern shows stable glycemia  - 6   AM to 10 AM:  Post breakfast hyperglycemia is noted  --10AM to 5 PM : No hypoglycemia observed during this time.  - 5   PM to 8 PM: Post meal hyperglycemia is noted       Average reading 169 mg/dL     Standard Dev/coefficient of variation  56   % of time <70 mg/dL 1   %   % of time >180  mg/dL  35 %   % of time within range  64 %  Number of hypoglycemia episodes noted: 0     Impression:   CGMS shows stable glycemia overnight with postprandial hyperglycemia     Recommendation:      Patient was advised to make the following changes in her diabetic regimen    Take meal boluses 10 minutes prior to eating

## 2025-02-27 NOTE — TELEPHONE ENCOUNTER
Patient had a virtual visit yesterday and brought pump in today for downloading.     Report attached.

## 2025-05-02 ENCOUNTER — TELEPHONE (OUTPATIENT)
Dept: ENDOCRINOLOGY | Age: 40
End: 2025-05-02

## 2025-06-30 ENCOUNTER — TELEPHONE (OUTPATIENT)
Dept: ENDOCRINOLOGY | Age: 40
End: 2025-06-30

## 2025-09-03 DIAGNOSIS — E10.65 TYPE 1 DIABETES MELLITUS WITH HYPERGLYCEMIA (HCC): ICD-10-CM

## 2025-09-04 RX ORDER — INSULIN LISPRO 100 [IU]/ML
INJECTION, SOLUTION INTRAVENOUS; SUBCUTANEOUS
Qty: 90 ML | Refills: 1 | Status: SHIPPED | OUTPATIENT
Start: 2025-09-04